# Patient Record
Sex: FEMALE | Race: WHITE | Employment: FULL TIME | URBAN - METROPOLITAN AREA
[De-identification: names, ages, dates, MRNs, and addresses within clinical notes are randomized per-mention and may not be internally consistent; named-entity substitution may affect disease eponyms.]

---

## 2018-09-07 ENCOUNTER — HOSPITAL ENCOUNTER (EMERGENCY)
Facility: CLINIC | Age: 28
Discharge: HOME | End: 2018-09-07
Attending: EMERGENCY MEDICINE | Admitting: EMERGENCY MEDICINE
Payer: COMMERCIAL

## 2018-09-07 ENCOUNTER — APPOINTMENT (OUTPATIENT)
Dept: CT IMAGING | Facility: CLINIC | Age: 28
End: 2018-09-07
Attending: EMERGENCY MEDICINE
Payer: COMMERCIAL

## 2018-09-07 VITALS
BODY MASS INDEX: 40.63 KG/M2 | OXYGEN SATURATION: 99 % | DIASTOLIC BLOOD PRESSURE: 62 MMHG | WEIGHT: 238 LBS | SYSTOLIC BLOOD PRESSURE: 112 MMHG | RESPIRATION RATE: 16 BRPM | HEART RATE: 78 BPM | TEMPERATURE: 98.7 F | HEIGHT: 64 IN

## 2018-09-07 DIAGNOSIS — R07.89 CHEST WALL PAIN: ICD-10-CM

## 2018-09-07 LAB
ALBUMIN UR-MCNC: NEGATIVE MG/DL
AMORPH CRY #/AREA URNS HPF: ABNORMAL /HPF
ANION GAP SERPL CALCULATED.3IONS-SCNC: 8 MMOL/L (ref 3–14)
APPEARANCE UR: ABNORMAL
BASOPHILS # BLD AUTO: 0 10E9/L (ref 0–0.2)
BASOPHILS NFR BLD AUTO: 0.2 %
BILIRUB UR QL STRIP: NEGATIVE
BUN SERPL-MCNC: 9 MG/DL (ref 7–30)
CALCIUM SERPL-MCNC: 8.1 MG/DL (ref 8.5–10.1)
CHLORIDE SERPL-SCNC: 108 MMOL/L (ref 94–109)
CO2 SERPL-SCNC: 23 MMOL/L (ref 20–32)
COLOR UR AUTO: YELLOW
CREAT SERPL-MCNC: 0.64 MG/DL (ref 0.52–1.04)
D DIMER PPP FEU-MCNC: 0.8 UG/ML FEU (ref 0–0.5)
DIFFERENTIAL METHOD BLD: ABNORMAL
EOSINOPHIL # BLD AUTO: 0.1 10E9/L (ref 0–0.7)
EOSINOPHIL NFR BLD AUTO: 0.8 %
ERYTHROCYTE [DISTWIDTH] IN BLOOD BY AUTOMATED COUNT: 13.9 % (ref 10–15)
GFR SERPL CREATININE-BSD FRML MDRD: >90 ML/MIN/1.7M2
GLUCOSE SERPL-MCNC: 90 MG/DL (ref 70–99)
GLUCOSE UR STRIP-MCNC: NEGATIVE MG/DL
HCT VFR BLD AUTO: 31.2 % (ref 35–47)
HGB BLD-MCNC: 10.4 G/DL (ref 11.7–15.7)
HGB UR QL STRIP: NEGATIVE
IMM GRANULOCYTES # BLD: 0.1 10E9/L (ref 0–0.4)
IMM GRANULOCYTES NFR BLD: 0.6 %
KETONES UR STRIP-MCNC: NEGATIVE MG/DL
LEUKOCYTE ESTERASE UR QL STRIP: ABNORMAL
LYMPHOCYTES # BLD AUTO: 1 10E9/L (ref 0.8–5.3)
LYMPHOCYTES NFR BLD AUTO: 12 %
MCH RBC QN AUTO: 29.9 PG (ref 26.5–33)
MCHC RBC AUTO-ENTMCNC: 33.3 G/DL (ref 31.5–36.5)
MCV RBC AUTO: 90 FL (ref 78–100)
MONOCYTES # BLD AUTO: 0.4 10E9/L (ref 0–1.3)
MONOCYTES NFR BLD AUTO: 5.3 %
MUCOUS THREADS #/AREA URNS LPF: PRESENT /LPF
NEUTROPHILS # BLD AUTO: 6.7 10E9/L (ref 1.6–8.3)
NEUTROPHILS NFR BLD AUTO: 81.1 %
NITRATE UR QL: NEGATIVE
NRBC # BLD AUTO: 0 10*3/UL
NRBC BLD AUTO-RTO: 0 /100
NT-PROBNP SERPL-MCNC: 33 PG/ML (ref 0–450)
PH UR STRIP: 7 PH (ref 5–7)
PLATELET # BLD AUTO: 224 10E9/L (ref 150–450)
POTASSIUM SERPL-SCNC: 3.7 MMOL/L (ref 3.4–5.3)
RBC # BLD AUTO: 3.48 10E12/L (ref 3.8–5.2)
RBC #/AREA URNS AUTO: 2 /HPF (ref 0–2)
SODIUM SERPL-SCNC: 139 MMOL/L (ref 133–144)
SOURCE: ABNORMAL
SP GR UR STRIP: 1.02 (ref 1–1.03)
SQUAMOUS #/AREA URNS AUTO: 7 /HPF (ref 0–1)
TROPONIN I SERPL-MCNC: <0.015 UG/L (ref 0–0.04)
UROBILINOGEN UR STRIP-MCNC: 0 MG/DL (ref 0–2)
WBC # BLD AUTO: 8.2 10E9/L (ref 4–11)
WBC #/AREA URNS AUTO: 0 /HPF (ref 0–5)

## 2018-09-07 PROCEDURE — 85025 COMPLETE CBC W/AUTO DIFF WBC: CPT | Performed by: EMERGENCY MEDICINE

## 2018-09-07 PROCEDURE — 93005 ELECTROCARDIOGRAM TRACING: CPT

## 2018-09-07 PROCEDURE — 36415 COLL VENOUS BLD VENIPUNCTURE: CPT | Performed by: EMERGENCY MEDICINE

## 2018-09-07 PROCEDURE — 85379 FIBRIN DEGRADATION QUANT: CPT | Performed by: EMERGENCY MEDICINE

## 2018-09-07 PROCEDURE — 71260 CT THORAX DX C+: CPT

## 2018-09-07 PROCEDURE — 25000128 H RX IP 250 OP 636: Performed by: EMERGENCY MEDICINE

## 2018-09-07 PROCEDURE — 99285 EMERGENCY DEPT VISIT HI MDM: CPT | Mod: 25

## 2018-09-07 PROCEDURE — 83880 ASSAY OF NATRIURETIC PEPTIDE: CPT | Performed by: EMERGENCY MEDICINE

## 2018-09-07 PROCEDURE — 84484 ASSAY OF TROPONIN QUANT: CPT | Performed by: EMERGENCY MEDICINE

## 2018-09-07 PROCEDURE — 81001 URINALYSIS AUTO W/SCOPE: CPT | Performed by: EMERGENCY MEDICINE

## 2018-09-07 PROCEDURE — 25000132 ZZH RX MED GY IP 250 OP 250 PS 637: Performed by: EMERGENCY MEDICINE

## 2018-09-07 PROCEDURE — 80048 BASIC METABOLIC PNL TOTAL CA: CPT | Performed by: EMERGENCY MEDICINE

## 2018-09-07 RX ORDER — IOPAMIDOL 755 MG/ML
500 INJECTION, SOLUTION INTRAVASCULAR ONCE
Status: COMPLETED | OUTPATIENT
Start: 2018-09-07 | End: 2018-09-07

## 2018-09-07 RX ORDER — ACETAMINOPHEN 325 MG/1
650 TABLET ORAL ONCE
Status: COMPLETED | OUTPATIENT
Start: 2018-09-07 | End: 2018-09-07

## 2018-09-07 RX ADMIN — IOPAMIDOL 79 ML: 755 INJECTION, SOLUTION INTRAVENOUS at 13:32

## 2018-09-07 RX ADMIN — ACETAMINOPHEN 650 MG: 325 TABLET, FILM COATED ORAL at 10:38

## 2018-09-07 RX ADMIN — SODIUM CHLORIDE 90 ML: 900 INJECTION, SOLUTION INTRAVENOUS at 13:32

## 2018-09-07 ASSESSMENT — ENCOUNTER SYMPTOMS
BACK PAIN: 1
FREQUENCY: 0
ARTHRALGIAS: 1

## 2018-09-07 NOTE — ED AVS SNAPSHOT
[facility] Emergency Department    [address]    [facility] [address]    Phone:  [phone]    Fax:  [phone]                                       Eloina Ann   MRN: [id number]    Department:  [facility] Emergency Department   Date of Visit:  9/7/2018           After Visit Summary Signature Page     I have received my discharge instructions, and my questions have been answered. I have discussed any challenges I see with this plan with the nurse or doctor.    ..........................................................................................................................................  Patient/Patient Representative Signature      ..........................................................................................................................................  Patient Representative Print Name and Relationship to Patient    ..................................................               ................................................  Date                                            Time    ..........................................................................................................................................  Reviewed by Signature/Title    ...................................................              ..............................................  Date                                                            Time          22EPIC Rev 08/18

## 2018-09-07 NOTE — ED AVS SNAPSHOT
[facility] Emergency Department    [address] [place] [address]    Phone:  [phone]    Fax:  [phone]                                       Eloina Ann   MRN: [id number]    Department:  [facility] Emergency Department   Date of Visit:  9/7/2018           Patient Information     Date Of Birth          1990        Your diagnoses for this visit were:     Chest wall pain        You were seen by Salina Martin MD.      Follow-up Information     Follow up with Clinic, [facility] [place]. Schedule an appointment as soon as possible for a visit in 3 days.    Contact information:    [address] [facility] [address]  [place] [address]  [phone]          Discharge Instructions       Please return to the ED if you have active chest pain, shortness of breath, nausea, sweatiness, or other acute changes.  Please follow up with your PCP in the next 2-3 days.      Discharge Instructions  Chest Pain    You have been seen today for chest pain or discomfort.  At this time, your provider has found no signs that your chest pain is due to a serious or life-threatening condition, (or you have declined more testing and/or admission to the hospital). However, sometimes there is a serious problem that does not show up right away. Your evaluation today may not be complete and you may need further testing and evaluation.     Generally, every Emergency Department visit should have a follow-up clinic visit with either a primary or a specialty clinic/provider. Please follow-up as instructed by your emergency provider today.  Return to the Emergency Department if:    Your chest pain changes, gets worse, starts to happen more often, or comes with less activity.    You are newly short of breath.    You get very weak or tired.    You pass out or faint.    You have any new symptoms, like fever, cough, numb legs, or you cough up blood.    You have anything else that worries  you.    Until you follow-up with your regular provider, please do the following:    Take one aspirin daily unless you have an allergy or are told not to by your provider.    If a stress test appointment has been made, go to the appointment.    If you have questions, contact your regular provider.    Follow-up with your regular provider/clinic as directed; this is very important.    If you were given a prescription for medicine here today, be sure to read all of the information (including the package insert) that comes with your prescription.  This will include important information about the medicine, its side effects, and any warnings that you need to know about.  The pharmacist who fills the prescription can provide more information and answer questions you may have about the medicine.  If you have questions or concerns that the pharmacist cannot address, please call or return to the Emergency Department.       Remember that you can always come back to the Emergency Department if you are not able to see your regular provider in the amount of time listed above, if you get any new symptoms, or if there is anything that worries you.      24 Hour Appointment Hotline       To make an appointment at any [facility]3], call [phone] ([phone]). If you don't have a family doctor or clinic, we will help you find one. [facility] are conveniently located to serve the needs of you and your family.             Review of your medicines      Our records show that you are taking the medicines listed below. If these are incorrect, please call your family doctor or clinic.        Dose / Directions Last dose taken    levothyroxine 150 MCG tablet   Commonly known as:  SYNTHROID/LEVOTHROID   Dose:  150 mcg   Quantity:  30 tablet        Take 1 tablet by mouth daily.   Refills:  6                Procedures and tests performed during your visit     Basic metabolic panel    CBC with platelets differential    CT Chest  Pulmonary Embolism w Contrast    D dimer quantitative    EKG 12-lead, tracing only    Nt probnp inpatient    Troponin I    UA reflex to Microscopic and Culture      Orders Needing Specimen Collection     None      Pending Results     Date and Time Order Name Status Description    9/7/2018 1132 CT Chest Pulmonary Embolism w Contrast Preliminary     9/7/2018 1028 EKG 12-lead, tracing only Preliminary             Pending Culture Results     No orders found from 9/5/2018 to 9/8/2018.            Pending Results Instructions     If you had any lab results that were not finalized at the time of your Discharge, you can call the ED Lab Result RN at [phone]. You will be contacted by this team for any positive Lab results or changes in treatment. The nurses are available 7 days a week from 10A to 6:30P.  You can leave a message 24 hours per day and they will return your call.        Test Results From Your Hospital Stay        9/7/2018 11:24 AM      Component Results     Component Value Ref Range & Units Status    Sodium 139 133 - 144 mmol/L Final    Potassium 3.7 3.4 - 5.3 mmol/L Final    Chloride 108 94 - 109 mmol/L Final    Carbon Dioxide 23 20 - 32 mmol/L Final    Anion Gap 8 3 - 14 mmol/L Final    Glucose 90 70 - 99 mg/dL Final    Urea Nitrogen 9 7 - 30 mg/dL Final    Creatinine 0.64 0.52 - 1.04 mg/dL Final    GFR Estimate >90 >60 mL/min/1.7m2 Final    Non  GFR Calc    GFR Estimate If Black >90 >60 mL/min/1.7m2 Final    African American GFR Calc    Calcium 8.1 (L) 8.5 - 10.1 mg/dL Final         9/7/2018 11:02 AM      Component Results     Component Value Ref Range & Units Status    WBC 8.2 4.0 - 11.0 10e9/L Final    RBC Count 3.48 (L) 3.8 - 5.2 10e12/L Final    Hemoglobin 10.4 (L) 11.7 - 15.7 g/dL Final    Hematocrit 31.2 (L) 35.0 - 47.0 % Final    MCV 90 78 - 100 fl Final    MCH 29.9 26.5 - 33.0 pg Final    MCHC 33.3 31.5 - 36.5 g/dL Final    RDW 13.9 10.0 - 15.0 % Final    Platelet Count 224 150 -  450 10e9/L Final    Diff Method Automated Method  Final    % Neutrophils 81.1 % Final    % Lymphocytes 12.0 % Final    % Monocytes 5.3 % Final    % Eosinophils 0.8 % Final    % Basophils 0.2 % Final    % Immature Granulocytes 0.6 % Final    Nucleated RBCs 0 0 /100 Final    Absolute Neutrophil 6.7 1.6 - 8.3 10e9/L Final    Absolute Lymphocytes 1.0 0.8 - 5.3 10e9/L Final    Absolute Monocytes 0.4 0.0 - 1.3 10e9/L Final    Absolute Eosinophils 0.1 0.0 - 0.7 10e9/L Final    Absolute Basophils 0.0 0.0 - 0.2 10e9/L Final    Abs Immature Granulocytes 0.1 0 - 0.4 10e9/L Final    Absolute Nucleated RBC 0.0  Final         9/7/2018 11:19 AM      Component Results     Component Value Ref Range & Units Status    D Dimer 0.8 (H) 0.0 - 0.50 ug/ml FEU Final    This D-dimer assay is intended for use in conjunction with a clinical pretest   probability assessment model to exclude pulmonary embolism (PE) and deep   venous thrombosis (DVT) in outpatients suspected of PE or DVT. The cut-off   value is 0.5 ug/mL FEU.           9/7/2018 11:24 AM      Component Results     Component Value Ref Range & Units Status    Troponin I ES <0.015 0.000 - 0.045 ug/L Final    The 99th percentile for upper reference range is 0.045 ug/L.  Troponin values   in the range of 0.045 - 0.120 ug/L may be associated with risks of adverse   clinical events.           9/7/2018  1:50 PM      Narrative     CT CHEST PULMONARY EMBOLISM WITH CONTRAST   9/7/2018 1:36 PM     HISTORY: Dyspnea.  16 weeks pregnant. Left shoulder pain that  tightness with taking a deep breath.    TECHNIQUE:  80 mL Isovue-370 IV. Radiation dose for this scan was  reduced using automated exposure control, adjustment of the mA and/or  kV according to patient size, or iterative reconstruction technique.    COMPARISON: Chest x-ray 12/19/2006    FINDINGS:  There is mild cardiomegaly with no specific chamber  enlargement. There is mild pulmonary vascular engorgement involving  pulmonary arteries  and veins. There is a sliding hiatal hernia.  Mediastinum and pulmonary saba otherwise appear normal. No pleural  effusion. No evidence of pulmonary embolism or aortic dissection.  Minimal left pleural effusion. No pulmonary infiltrates. Visualized  abdominal organs are unremarkable.        Impression     IMPRESSION: Mild cardiomegaly and pulmonary vascular engorgement. No  evidence of pulmonary embolism.         9/7/2018 12:34 PM      Component Results     Component Value Ref Range & Units Status    Color Urine Yellow  Final    Appearance Urine Cloudy  Final    Glucose Urine Negative NEG^Negative mg/dL Final    Bilirubin Urine Negative NEG^Negative Final    Ketones Urine Negative NEG^Negative mg/dL Final    Specific Gravity Urine 1.018 1.003 - 1.035 Final    Blood Urine Negative NEG^Negative Final    pH Urine 7.0 5.0 - 7.0 pH Final    Protein Albumin Urine Negative NEG^Negative mg/dL Final    Urobilinogen mg/dL 0.0 0.0 - 2.0 mg/dL Final    Nitrite Urine Negative NEG^Negative Final    Leukocyte Esterase Urine Small (A) NEG^Negative Final    Source Midstream Urine  Final    RBC Urine 2 0 - 2 /HPF Final    WBC Urine 0 0 - 5 /HPF Final    Squamous Epithelial /HPF Urine 7 (H) 0 - 1 /HPF Final    Mucous Urine Present (A) NEG^Negative /LPF Final    Amorphous Crystals Many (A) NEG^Negative /HPF Final         9/7/2018  2:41 PM      Component Results     Component Value Ref Range & Units Status    N-Terminal Pro BNP Inpatient 33 0 - 450 pg/mL Final       Reference range shown and results flagged as abnormal are suggested inpatient   cut points for confirming diagnosis if CHF in an acute setting. Establishing a   baseline value for each individual patient is useful for follow-up. An   inpatient or emergency department NT-proPBNP <300 pg/mL effectively rules out   acute CHF, with 99% negative predictive value.  The outpatient non-acute reference range for ruling out CHF is:   0-125 pg/mL (age 18 to less than 75)   0-450 pg/mL  (age 75 yrs and older)                  Clinical Quality Measure: Blood Pressure Screening     Your blood pressure was checked while you were in the emergency department today. The last reading we obtained was  BP: 112/62 . Please read the guidelines below about what these numbers mean and what you should do about them.  If your systolic blood pressure (the top number) is less than 120 and your diastolic blood pressure (the bottom number) is less than 80, then your blood pressure is normal. There is nothing more that you need to do about it.  If your systolic blood pressure (the top number) is 120-139 or your diastolic blood pressure (the bottom number) is 80-89, your blood pressure may be higher than it should be. You should have your blood pressure rechecked within a year by a primary care provider.  If your systolic blood pressure (the top number) is 140 or greater or your diastolic blood pressure (the bottom number) is 90 or greater, you may have high blood pressure. High blood pressure is treatable, but if left untreated over time it can put you at risk for heart attack, stroke, or kidney failure. You should have your blood pressure rechecked by a primary care provider within the next 4 weeks.  If your provider in the emergency department today gave you specific instructions to follow-up with your doctor or provider even sooner than that, you should follow that instruction and not wait for up to 4 weeks for your follow-up visit.        Thank you for choosing [facility]       Thank you for choosing [facility] for your care. Our goal is always to provide you with excellent care. Hearing back from our patients is one way we can continue to improve our services. Please take a few minutes to complete the written survey that you may receive in the mail after you visit with us. Thank you!        [facility] Information     NextPage lets you send messages to your doctor, view your test results, renew your prescriptions,  "schedule appointments and more. To sign up, go to [email or web address] . Click on \"Log in\" on the left side of the screen, which will take you to the Welcome page. Then click on \"Sign up Now\" on the right side of the page.     You will be asked to enter the access code listed below, as well as some personal information. Please follow the directions to create your username and password.     Your access code is: [email or web address]  Expires: 2018  3:18 PM     Your access code will  in 90 days. If you need help or a new code, please call your [facility] clinic or [phone].        Care EveryWhere ID     This is your Care EveryWhere ID. This could be used by other organizations to access your [facility] medical records  [id number]        Equal Access to Services     JAIDEN BLAKE : Chasity Ying, xiomara calvin, nicole workman, marielena cortes . So [facility] [phone].    ATENCIÓN: Si habla español, tiene a hernandez disposición servicios gratuitos de asistencia lingüística. Llame al [phone].    We comply with applicable federal civil rights laws and [place] laws. We do not discriminate on the basis of race, color, national origin, age, disability, sex, sexual orientation, or gender identity.            After Visit Summary       This is your record. Keep this with you and show to your community pharmacist(s) and doctor(s) at your next visit.                  "

## 2018-09-07 NOTE — ED TRIAGE NOTES
Pt has left shoulder pain since last night.  Today, the pain is near rib area.  Pain is worse with deep breaths.  Pt is 15 weeks pregnant, no vaginal bleeding.

## 2018-09-07 NOTE — ED PROVIDER NOTES
"  History     Chief Complaint:  Shoulder Pain      HPI   Eloina Ann is a 28 year old female, approximately 16 weeks pregnant,  who presents with her mother for evaluation of some shoulder pain. The patient reports that she started to feel a pain in her left shoulder which feels like a knot and tightens with taking a deep breath. The patient reports that the pain is present constantly but is worsened by the deep breath. She describes the pain as a stabbing pain. She reports that her symptoms were worst like night but the pain is still present today. Today, she also reports some radiation into her back. The patient does not have any additional concerns at this time. She mentions some vaginal discharge which is not new or concerning for her. The patient denies urinary symptoms of increased urgency or increased frequency. She denies any falls, or trauma.The patient denies any known family history of blood clots. She denies any complications with the pregnancy. She took tylenol last night for symptoms.     Allergies:  Sulfa drugs     Medications:    Levothyroxine     Past Medical History:    Thyroid disease    Past Surgical History:    History reviewed. No pertinent surgical history.    Family History:    No family history on file.    Social History:  The patient  reports that she has never smoked. She does not have any smokeless tobacco history on file. She reports that she does not drink alcohol or use illicit drugs.   Marital Status:  Single [1]     Review of Systems   Genitourinary: Positive for vaginal discharge. Negative for frequency and urgency.   Musculoskeletal: Positive for arthralgias and back pain.   All other systems reviewed and are negative.    Physical Exam   Vital signs:  Temp: 98.7  F (37.1  C) Temp src: Temporal BP: 112/62 Pulse: 78 Heart Rate: 83 Resp: 18 SpO2: 99 % O2 Device: None (Room air)   Height: 162.6 cm (5' 4\") Weight: 108 kg (238 lb)  Estimated body mass index is 40.85 kg/(m^2) as " "calculated from the following:    Height as of this encounter: 1.626 m (5' 4\").    Weight as of this encounter: 108 kg (238 lb).      Patient Vitals for the past 24 hrs:   BP Temp Temp src Pulse Heart Rate Resp SpO2 Height Weight   09/07/18 1500 112/62 - - - - - - - -   09/07/18 1445 114/73 - - - - - 99 % - -   09/07/18 1430 116/73 - - - - - 100 % - -   09/07/18 1416 113/71 - - - 83 - 100 % - -   09/07/18 1415 113/72 - - - - - 100 % - -   09/07/18 1411 113/71 - - 78 - 18 100 % - -   09/07/18 1410 - - - - - - 100 % - -   09/07/18 1130 119/72 - - - - - - - -   09/07/18 1115 113/74 - - - - - - - -   09/07/18 1100 123/79 - - - - - - - -   09/07/18 1045 122/81 - - - - - - - -   09/07/18 1006 127/87 98.7  F (37.1  C) Temporal - 97 18 98 % 1.626 m (5' 4\") 108 kg (238 lb)         Physical Exam  General: Resting on the bed.  Head: No obvious trauma to head.  Ears, Nose, Throat:  External ears normal.  Nose normal.   Eyes:  Conjunctivae clear.  Pupils are equal, round, and reactive.   Neck: Normal range of motion.  Neck supple.   CV: Regular rate and rhythm.  No murmurs.  2 + radial pulses.  Tender left costal margin.    Respiratory: Effort normal and breath sounds normal.  No wheezing or crackles.   Gastrointestinal: Soft.  No distension. There is no tenderness.   Musculoskeletal: Non tender non edematous calves  Neuro: Alert. Moving all extremities appropriately.  Normal speech.    Skin: Skin is warm and dry.  No rash noted.     Emergency Department Course   ECG (10:43:25):  Rate 79 bpm. WV interval 132. QRS duration 90. QT/QTc 390/447. P-R-T axes 6 44 19. Normal Sinus rhythm. Normal EKG.  Interpreted at 1050 by Salina Martin MD.     Imaging:  CT Chest Pulmonary Embolism w Contrast   Preliminary Result   IMPRESSION: Mild cardiomegaly and pulmonary vascular engorgement. No   evidence of pulmonary embolism.           Laboratory:  UA: Leukocyte Esterase Urine Small, Squamous Epithelial 7H, Mucous Urine Present, " Amorphous Crystals Many otherwise within normal limits   BMP: 8.1  CMP: RBC 3.48, HGB 10.4 , 'HCT 31.2, otherwise within normal limits   D Dimer 0.8  Troponin <0.015 @ 1124  Nt probnp: 33      Interventions:  1033: NS 1L IV Bolus   1038: Tylenol 650mg PO     Emergency Department Course:  1005 Nursing notes and vitals reviewed.  I performed an exam of the patient as documented above.     IV inserted. Medicine administered as documented above. Blood drawn. This was sent to the lab for further testing, results above.    The patient was sent for a CT Chest while in the emergency department, findings above.     EKG obtained, findings above.     1506: I spoke with Cardiologist, Dr. Menezes     Findings and plan explained to the Patient. Patient discharged home with instructions regarding supportive care, medications, and reasons to return. The importance of close follow-up was reviewed    I personally reviewed the laboratory results with the Patient and answered all related questions prior to discharge.   Impression & Plan    Medical Decision Makin-year-old female 16 weeks pregnant who presents with chest pain.  Vital signs unremarkable.  Broad differential pursued including not limited to cardiomyopathy, PE, acute coronary syndrome, electrolyte metabolic or renal dysfunction, chest wall pain, pericarditis, myocarditis, dehydration, muscular pain, etc.  Overall patient's well-appearing nontoxic.  He was obtained given that she is pregnant only show small leukoesterase is no evidence of bacteria or other signs of infection.  No suggestion of asymptomatic bacteriuria.  BMP shows no acute electrolyte metabolic or renal dysfunction.  CBC shows no leukocytosis and mild anemia 10.4, within normal.  EKG shows sinus rhythm no acute ST-T wave changes, normal troponin troponin, normal intervals, no evidence of ischemia.  Patient does not have any chest pain.  No suggestion of myocarditis or pericarditis.  Was mostly concerned  about PE and d-dimer is positive at 0.8, this is about the first trimester cut up as well.  Therefore discussed with radiology and patient opted to do CT PE study.  CT PE shows no evidence of pulmonary embolism but does show mild cardiomegaly and pulmonary vascular engorgement.  BMP adenopathy normal.  Discussed in brief with cardiology they felt that the  mild cardiomegaly may be secondary to patient's pregnancy and physiologic.  With otherwise reassuring labs and vital signs and not per the echocardiogram or further workup is warranted.  Consider preeclampsia the patient is under 20 weeks and a normal vital signs this was unlikely.  Patient was advised to do warm compresses and Tylenol.  Suspect is most likely musculoskeletal chest wall pain.  She was on exam plan was discharged in stable condition.    Critical Care time:  none    Diagnosis:    ICD-10-CM    1. Chest wall pain R07.89        Disposition:  discharged to home    Discharge Medications:  New Prescriptions    No medications on file     Dann VALLES am serving as a scribe at 10:05 AM on 9/7/2018 to document services personally performed by Salina Martin MD based on my observations and the provider's statements to me.    [facility] EMERGENCY DEPARTMENT       Salina Martin MD  09/07/18 3103

## 2018-09-10 LAB — INTERPRETATION ECG - MUSE: NORMAL

## 2021-09-21 PROBLEM — Z78.9 NOT IMMUNE TO HEPATITIS B VIRUS: Status: ACTIVE | Noted: 2018-07-13

## 2021-09-23 ENCOUNTER — OFFICE VISIT (OUTPATIENT)
Dept: SURGERY | Facility: CLINIC | Age: 31
End: 2021-09-23
Payer: COMMERCIAL

## 2021-09-23 VITALS
DIASTOLIC BLOOD PRESSURE: 84 MMHG | BODY MASS INDEX: 46.78 KG/M2 | SYSTOLIC BLOOD PRESSURE: 120 MMHG | WEIGHT: 274 LBS | HEIGHT: 64 IN

## 2021-09-23 DIAGNOSIS — L91.8 MULTIPLE ACQUIRED SKIN TAGS: ICD-10-CM

## 2021-09-23 DIAGNOSIS — M25.579 CHRONIC ANKLE PAIN, UNSPECIFIED LATERALITY: ICD-10-CM

## 2021-09-23 DIAGNOSIS — L68.0 HIRSUTISM: ICD-10-CM

## 2021-09-23 DIAGNOSIS — G89.29 CHRONIC ANKLE PAIN, UNSPECIFIED LATERALITY: ICD-10-CM

## 2021-09-23 DIAGNOSIS — G47.8 NON-RESTORATIVE SLEEP: ICD-10-CM

## 2021-09-23 DIAGNOSIS — E07.9 THYROID DISEASE: ICD-10-CM

## 2021-09-23 DIAGNOSIS — E66.01 MORBID OBESITY (H): Primary | ICD-10-CM

## 2021-09-23 DIAGNOSIS — M54.50 LOW BACK PAIN, UNSPECIFIED BACK PAIN LATERALITY, UNSPECIFIED CHRONICITY, UNSPECIFIED WHETHER SCIATICA PRESENT: ICD-10-CM

## 2021-09-23 PROCEDURE — 99205 OFFICE O/P NEW HI 60 MIN: CPT | Performed by: FAMILY MEDICINE

## 2021-09-23 RX ORDER — PHENTERMINE HYDROCHLORIDE 37.5 MG/1
18.75-37.5 TABLET ORAL
Qty: 90 TABLET | Refills: 0 | Status: SHIPPED | OUTPATIENT
Start: 2021-09-23 | End: 2021-12-22

## 2021-09-23 RX ORDER — LEVOTHYROXINE SODIUM 175 UG/1
1 TABLET ORAL DAILY
COMMUNITY
Start: 2021-07-08

## 2021-09-23 RX ORDER — NAPROXEN 500 MG/1
1 TABLET ORAL DAILY
COMMUNITY
Start: 2021-04-10 | End: 2023-05-16

## 2021-09-23 ASSESSMENT — MIFFLIN-ST. JEOR: SCORE: 1955.61

## 2021-09-23 NOTE — LETTER
9/23/2021         RE: Eloina Ann  [id number] [facility] [address]        Dear Colleague,    Thank you for referring your patient, Eloina Ann, to the [facility] [place]. Please see a copy of my visit note below.    BARIATRIC CONSULTATION    Impression: Eloina Ann is a 31 year old year old female with  has a past medical history of Ankle pain, Esophageal reflux, Hirsutism, Low back pain, Multiple acquired skin tags, and Thyroid disease.  Poor functional capacity and musculoskeletal disability in the setting of the abovementioned weight related co-morbidities. Her Body mass index is 46.78 kg/m ..    Plan: DIET: 24 week program. Meal planning, protein first for insulin resistance.   EXERCISE start counting steps with apple phone   REFERRAL(S) sleep study, no labs d/t high deductible. 24 week program.    PHARMACOTHERAPY  Phentermine 1/2 tab in the am..     We discussed [facility] Basics including:  -eating 3 meals daily  -eating protein first  -eating slowly, chewing food well  -avoiding/limiting calorie containing beverages  -choosing wheat, not white with breads, crackers, pastas, deni, bagels, tortillas, rice  -limiting restaurant or cafeteria eating to twice a week or less    We discussed the importance of restorative sleep and stress management in maintaining a healthy weight.    We reviewed medications associated with weight gain.    We discussed insulin resistance and glycemic index as it relates to appetite and weight control.     We discussed the National Weight Control Registry healthy weight maintenance strategies and ways to optimize metabolism.  We discussed the importance of physical activity including cardiovascular and strength training in maintaining a healthier weight and explored viable options.    We discussed medications available for weight loss including Phentermine, Phendimetrazine, Topamax, Qsymia, ,  "Diethylproprion, Orlistat, Contrave, Saxenda, Wegovy and Vyvanse. We discussed the risks and benefits of each. We discussed indications, contraindications, potential side effects, and estimated costs of each. Literature was provided. Eloina understands that not using a weight loss medication is an option.   60 minutes spent with patient. >50% in counseling.      History Surrounding Consultation  Struggles with weight started at age as a youngster  Her weight at age 20 was 170#  She has had several past supervised and unsupervised weight loss attempts  The most weight lost was: 20#  Unfortunately there was not durable weight maintenance.  History of bulimia, anorexia, or binge eating disorder? no  If Present has eating disorder been in remission at least 3 years? no  Night time eating? no    Dietary History  Meals per day: 2-3  Snacks: between meals  Typical Snack: chips  Who does the grocery shopping? shared  Who does the cooking? She does  A typical meal includes: B:eggs with cheese and ham or turkey, coffee L: sandwich white-12 grain or leftovers D:chilpoltle  Regular Pop: none  Juice: no  Caffeine: coffee 2/c  Amount of restaurant eating per week: 3  Eating a the table with the TV off? yes    Physical Activity Patterns  Current physical activity routine includes: Used to play hockey and had an exercise gym in her apartment.     Limitations from being physically active on a regular basis includes: knees    She describes her general health as: fair    Past Medical History  HTN: no  Dyslipidemia: ?  KATJA: no but snores loudly and has neck 16\" and malapati 4+  Obesity Hypoventilation: NO  DM2: no DM1: no DX: no Most recent AIC: NA  Neuropathy: no  Nephropathy: no  Retinopathy: no Glaucoma no  IFG or \"pre-DM\": no  MI: no  CVA:no  CHF: no  Heart Valves: native  Previous cardiac testing includes:   Cancers: no  Kidney Disease: no  DVT: no  PE: no  Colitis: no  Crohn's: no  IBS: no  PUD: no  Fatty Liver: ?  Abnormal " LFTs: no  Hepatitis: no  Asthma: no  Bronchitis: no  Pneumonia: no  Other Lung Problems: no  Back Pain:yes  DDD: no  Gout: no  Fibromyalgia: no  USI: yes  Severe Headaches: no  Seizures: no If so, last seizure: no  Pseudotumor: no  PCOS: yes  Menstrual Irregularity: no  Menorrhagia: no  Infertility: no  Thyroid problems: yes  Thyroid medications: yes  HIV positive: NO  MRSA/VRE history: no  History of Blood transfusion: no  Anemia: no    Health Care Maintenance  Colonoscopy: NA  Mammogram: NA  Pap: utd    Medications   Current Outpatient Medications   Medication Sig Dispense Refill     etonogestrel (NEXPLANON) 68 MG IMPL Inject 68 mg Subcutaneous       levothyroxine (SYNTHROID, LEVOTHROID) 150 MCG tablet Take 1 tablet by mouth daily. 30 tablet 6     levothyroxine (SYNTHROID/LEVOTHROID) 175 MCG tablet Take 1 tablet by mouth daily       naproxen (NAPROSYN) 500 MG tablet Take 1 tablet by mouth daily       phentermine (ADIPEX-P) 37.5 MG tablet Take 0.5-1 tablets (18.75-37.5 mg) by mouth every morning (before breakfast) 90 tablet 0     Allergies   Sulfa drugs and Vancomycin  Past Surgical History  Past Surgical History:   Procedure Laterality Date      SECTION       NM THYROID ABLATION THERAPY I 131       WISDOM TOOTH EXTRACTION       History of problems with anesthesia: no  History of Malignant Hyperthermia: NO    Gynecological History  Regular: yes  Currently: none with implant  Problems getting pregnant: no  MD Involvement: NA If so, explanation/Diagnosis: NA  : 1  Para: 1001  C-S: 1  Vaginal deliveries: 0  SAB:no  EAB: no  Gestational DM: no  Gestational HTN: no  Preeclampsia: no  Current Birth Control: nexplanon    Family History  family history includes Breast Cancer in her paternal grandmother; Celiac Disease in her father; No Known Problems in her mother.    Social History  Status: boyfriend  Children: 1 son  Work Status: FT accounting      Addiction History  Smoking History:   Started smoking:  "never Quit smoking: NA Total years of tobacco use: 0  Alcohol use: 0-5/wk  Current or Past history of alcohol or substance abuse: no  Last used: no  Chemical Dependency Treatment History: no  Chemicals: none    Psychiatric History  Diagnoses: anxiety  Treated by: NA  Psychiatric Hospitalizations: no  Suicide attempts: no  ECT: no  Panic attacks: no  History of Abuse: no    Palliative Medicine History  Involvement in a pain clinic: no    ROS  Sleep  Snoring: yes  PND: yes  Witnessed Apneas: no  [place]: 10  STOP BAN/8  General  Fatigue: yes  Sleep Quality:non-restorative 6 hours,   HEENT  Visual changes: no  Gastrointestinal  Heartburn: yes  Dysphagia: no  Cardiovascular  Murmur: no  Elevated BP: no  Chest Pain with Exertion: no  Dyspnea with Exertion: yes  Palpitations: no  Lower Extremity Edema: yes  Syncope: no  Pulmonary  Shortness of breath at rest: no  Snoring: yes  PND: yes  Wheezing: no  CPAP use: no  Gastrointestinal  Trouble swallowing:no  Heartburn: yes  HX UGI/EGD: no  Abdominal pain: no  Hematochezia: no  Urologic  Hesitancy: no  Urgency: yes  Genitourinary  ED: NA  Menorrhagia: no  Dysmenorrhea: no  Neurologic  Severe headache:no  Paresthesias: no  Psychiatric  Moods Stable: yes  Hallucinations: no  Rheumatologic  Myalgias: yes  Arthralgias: yes  Endocrine  Polydipsia: no  Polyuria: no  Galactorrhea: no  Heat intolerance: yes  Hirsutism: yes  Musculoskeletal  Joint pain;yes  Falls: no  Use of cane, crutch or motorized scooter: no  Hematologic  Abnormal Bleeding or Clotting: no  Dermatologic  Skin Tags: yes  Striae: yes  Furuncless: no  Acne: no  Intertrigo: o  Lower Leg ulcers: no      Physical Exam  /84 (BP Location: Right arm, Patient Position: Sitting, Cuff Size: Adult Large)   Ht 1.63 m (5' 4.17\")   Wt 124.3 kg (274 lb)   Breastfeeding No   BMI 46.78 kg/m          General Appearance  No acute distress. Obesity: central  Alert: yes  Sleepy: no  HEENT  PERRLA, EOMI  Neck  Stout: 15\" No " carotid bruits  Airway: 4+  Cardiovascular  Rhythm regular Rate Regular  Murmur: no  Pulmonary  [place] Score: 10  Lungs clear to ascultation  Abdomen  No rashes.   Post surgical Scars: c-s  Extremities:  Pitting edema: trace  Palpable distal pulses: 2+  Varicose veins: no  Neurologic  Tremors: no  Psychiatric  Thought Content Organized  Mood appears stable  Endocrine  Moon Facies: NO  Dorsal Thoracic Prominence: NO  Skin tags: yes  Acanthosis nigricans: no  Dermatologic  Intertrigo: no            Total time spent on the date of this encounter doing: chart review, review of test results, patient visit, physical exam, education, counseling, developing plan of care, and documenting = 60 minutes.              Again, thank you for allowing me to participate in the care of your patient.        Sincerely,        Theresa Shell MD

## 2021-09-23 NOTE — PATIENT INSTRUCTIONS
"[facility] Basics    Remember to: download the GOOD RX  [place], Vitamin D3, 2000U daily, B-12 \"sublingual\"or \"Rapid Disssolve\"    -Eat 3 meals a day (not 2, not 5) Chew your food well/SLOW down  -Eat your protein first  -Be a water drinker/Minize liquid calories (no regular pop, no juice) skim or 1% milk OK  -Sleep 7-8 hours each night. Address sleep if problematic  -Stress management is important. Address if problematic  -Move-8000 steps daily Muscle: maintain your muscle mass (strength training 2X/wk)  -Wheat, not white (bread, pasta, crackers, deni, bagels, tortillas, rice)  -Limit restaurant, cafeteria, take out, drive through to 2 times per week or less  -Minimize caffeine, alcohol, and night-time snacking  -Consider keeping a food diary (i.e. My Fitness Pal, Lose It, or other food tracker)  -Follow up with the dietitian    MEDICATIONS FOR WEIGHT LOSS    PHENTERMINE (Adipex): approved in 1959 for appetite suppression.  It has stimulant effects and cannot be used with Ritalin, Concerta, or other stimulants.  It is not addictive although it's chemically related to amphetamines.  Amphetamines are addictive. The most common side effects are dry mouth, increased energy and concentration, increased pulse, and constipation.  You should not take phentermine if you have glaucoma, hyperthyroidism, or uncontrolled/untreated hypertension.  $24-$30 for 90 tablets    PHENDIMETRAZINE (Bontril): Appetite suppressant/sympathomimetic.  Controlled substance.  Side effects and contraindications similar to phentermine.  $45-$60 for 3 month supply    TOPIRAMATE (Topamax): Anti-seizure medication, also used to prevent migraines.  Side effects include paresthesia, glaucoma, altered concentration, attention difficulties, memory and speech problems, metabolic acidosis, depression, increase in body temperature and decrease sweating, kidney stones, and weight loss.  Do not take Topamax while taking Depakote as this can cause high " ammonia levels.  You must have reliable birth control as Topamax can cause birth defects.  Discontinue slowly to avoid seizure.  Insurance usually covers Topiramate.    QSYMIA (Phentermine + Topamax):  See above information about phentermine and Topamax.  Most common side effects are paresthesia, dizziness, distortion of taste, insomnia, constipation, and dry mouth.  $150-$220 per month    DIETHYLPROPION (Tenuate): Sympathomimetic amine.  Appetite suppressant.  Doses 25 mg before meals or 75 mg per day.  Most common side effects are hypertension, palpitations, EKG changes, and increased seizures in epileptics.  There can be a possible adverse reaction with alcohol.  $70-$90 per 3 months    XENICAL(Orlistat) (Robert OTC): Approved in 1999.  A fat-blocker.  It reduces absorption of fat by approximately 30%.  It has beneficial effects on lipid levels.  Side effects include diarrhea, abdominal cramping, fecal incontinence, oily spotting, and flatus with discharge.  Side effects are minimized if the patient limits their dietary fat to no more than 30% of their diet.  Patients must take a multivitamin daily to avoid vitamin D, E, A, and K deficiency.  $120 per month    CONTRAVE (naltrexone/bupropion): Approved in 2014.  It is a combination pill including an opioid receptor blocker and a long-standing antidepressant.  Most common side effects include nausea, constipation, headache, vomiting, dizziness, trouble sleeping, dry mouth, and diarrhea.  With all antidepressants watch for mood changes and suicide ideation.  Bupropion has been known to lower the seizure threshold in those prone to seizures.  It should not be used in a patient with a recent history of bulimia. It has been associated with liver damage from taking higher than recommended doses.  Do not use countrave if you have taken opioid medications or opioid street drugs in the past 7-10 days, if you are currently on opioids, methadone, or if you are pregnant.  Do  not use contrave if you have recently stopped using alcohol or benzodiazepines.  Taper off contrave slowly.  Dosing: titrate up to 2 tablets twice daily of the Naltrexone 8 mg/ Bupropion 90 mg tablets.  $200 for 90 tablets    SAXENDA (Liraglutide): A daily injectable (3mg daily) medication used for type 2 diabetes. Glucagon-like peptide-1 (GLP-1) agonist. Contraindications include personal or family history of medullary thyroid cancer or MEN type 2. Acute pancreatitis has been observed in patients taking liraglutide. Liraglutide causes C-cell tumors in rats and mice. It is unknown whether liraglutide causes tumors in humans. Start at 0.6mg, increasing the dose weekly up to 3mg.     VYVANSE (Lisdexamfetamine dimesylate): a CNS stimulant used to treat ADHD. Indicated for the treatment of moderate to severe Binge Eating Disorder in Adults. Contraindicated in patients with known heart disease, structural abnormalities of the heart, serious heart arrhythmias or unexplained syncope. CNS stimulants such as vyvanse may cause manic or psychotic symptoms in patients with BPAD or pre-existing psychosis. Use with caution in patients with Raynaud's phenomenon. Most common side effects include dry mouth, insomnia, decreased appetite, increased heart rate, jittery feeling, constipation and anxiety.     WEGOVY (Semaglutide): A weekly injectable (2.4mg weekly) medication used for type 2 diabetes. Glucagon-like peptide-1 (GLP-1) agonist. Contraindications include personal or family history of medullary thyroid cancer or MEN type 2. Acute pancreatitis has been observed in patients taking Semaglutide. Semaglutide causes C-cell tumors in rats and mice. It is unknown whether Semaglutide causes tumors in humans. Start at 0.25mg, increasing to 0.5, 1.0, 1.7 then 2.4 monthly.   **Some lean proteins: chicken, turkey, tuna, salmon, crab, fish, shrimp, scallops, lobster, lean cuts of beef and pork, luncheon meats, veggie burgers, beans (black,  lima, garbanzo, durand, kidney, refried), chile, cottage cheese, string cheese, other cheese, eggs, tofu, peanut butter, nuts, vegan crumbles, greek yogurt

## 2021-09-23 NOTE — PROGRESS NOTES
BARIATRIC CONSULTATION    Impression: Eloina Ann is a 31 year old year old female with  has a past medical history of Ankle pain, Esophageal reflux, Hirsutism, Low back pain, Multiple acquired skin tags, and Thyroid disease.  Poor functional capacity and musculoskeletal disability in the setting of the abovementioned weight related co-morbidities. Her Body mass index is 46.78 kg/m ..    Plan: DIET: 24 week program. Meal planning, protein first for insulin resistance.   EXERCISE start counting steps with apple phone   REFERRAL(S) sleep study, no labs d/t high deductible. 24 week program.    PHARMACOTHERAPY  Phentermine 1/2 tab in the am..     We discussed [facility] Basics including:  -eating 3 meals daily  -eating protein first  -eating slowly, chewing food well  -avoiding/limiting calorie containing beverages  -choosing wheat, not white with breads, crackers, pastas, deni, bagels, tortillas, rice  -limiting restaurant or cafeteria eating to twice a week or less    We discussed the importance of restorative sleep and stress management in maintaining a healthy weight.    We reviewed medications associated with weight gain.    We discussed insulin resistance and glycemic index as it relates to appetite and weight control.     We discussed the National Weight Control Registry healthy weight maintenance strategies and ways to optimize metabolism.  We discussed the importance of physical activity including cardiovascular and strength training in maintaining a healthier weight and explored viable options.    We discussed medications available for weight loss including Phentermine, Phendimetrazine, Topamax, Qsymia, , Diethylproprion, Orlistat, Contrave, Saxenda, Wegovy and Vyvanse. We discussed the risks and benefits of each. We discussed indications, contraindications, potential side effects, and estimated costs of each. Literature was provided. Eloina understands that not using a weight loss medication  "is an option.   60 minutes spent with patient. >50% in counseling.      History Surrounding Consultation  Struggles with weight started at age as a youngster  Her weight at age 20 was 170#  She has had several past supervised and unsupervised weight loss attempts  The most weight lost was: 20#  Unfortunately there was not durable weight maintenance.  History of bulimia, anorexia, or binge eating disorder? no  If Present has eating disorder been in remission at least 3 years? no  Night time eating? no    Dietary History  Meals per day: 2-3  Snacks: between meals  Typical Snack: chips  Who does the grocery shopping? shared  Who does the cooking? She does  A typical meal includes: B:eggs with cheese and ham or turkey, coffee L: sandwich white-12 grain or leftovers D:chilpoltle  Regular Pop: none  Juice: no  Caffeine: coffee 2/c  Amount of restaurant eating per week: 3  Eating a the table with the TV off? yes    Physical Activity Patterns  Current physical activity routine includes: Used to play hockey and had an exercise gym in her apartment.     Limitations from being physically active on a regular basis includes: knees    She describes her general health as: fair    Past Medical History  HTN: no  Dyslipidemia: ?  KATJA: no but snores loudly and has neck 16\" and malapati 4+  Obesity Hypoventilation: NO  DM2: no DM1: no DX: no Most recent AIC: NA  Neuropathy: no  Nephropathy: no  Retinopathy: no Glaucoma no  IFG or \"pre-DM\": no  MI: no  CVA:no  CHF: no  Heart Valves: native  Previous cardiac testing includes:   Cancers: no  Kidney Disease: no  DVT: no  PE: no  Colitis: no  Crohn's: no  IBS: no  PUD: no  Fatty Liver: ?  Abnormal LFTs: no  Hepatitis: no  Asthma: no  Bronchitis: no  Pneumonia: no  Other Lung Problems: no  Back Pain:yes  DDD: no  Gout: no  Fibromyalgia: no  USI: yes  Severe Headaches: no  Seizures: no If so, last seizure: no  Pseudotumor: no  PCOS: yes  Menstrual Irregularity: no  Menorrhagia: " no  Infertility: no  Thyroid problems: yes  Thyroid medications: yes  HIV positive: NO  MRSA/VRE history: no  History of Blood transfusion: no  Anemia: no    Health Care Maintenance  Colonoscopy: NA  Mammogram: NA  Pap: utd    Medications   Current Outpatient Medications   Medication Sig Dispense Refill     etonogestrel (NEXPLANON) 68 MG IMPL Inject 68 mg Subcutaneous       levothyroxine (SYNTHROID, LEVOTHROID) 150 MCG tablet Take 1 tablet by mouth daily. 30 tablet 6     levothyroxine (SYNTHROID/LEVOTHROID) 175 MCG tablet Take 1 tablet by mouth daily       naproxen (NAPROSYN) 500 MG tablet Take 1 tablet by mouth daily       phentermine (ADIPEX-P) 37.5 MG tablet Take 0.5-1 tablets (18.75-37.5 mg) by mouth every morning (before breakfast) 90 tablet 0     Allergies   Sulfa drugs and Vancomycin  Past Surgical History  Past Surgical History:   Procedure Laterality Date      SECTION       NM THYROID ABLATION THERAPY I 131       WISDOM TOOTH EXTRACTION       History of problems with anesthesia: no  History of Malignant Hyperthermia: NO    Gynecological History  Regular: yes  Currently: none with implant  Problems getting pregnant: no  MD Involvement: NA If so, explanation/Diagnosis: NA  : 1  Para: 1001  C-S: 1  Vaginal deliveries: 0  SAB:no  EAB: no  Gestational DM: no  Gestational HTN: no  Preeclampsia: no  Current Birth Control: nexplanon    Family History  family history includes Breast Cancer in her paternal grandmother; Celiac Disease in her father; No Known Problems in her mother.    Social History  Status: boyfriend  Children: 1 son  Work Status: FT accounting      Addiction History  Smoking History:   Started smoking: never Quit smoking: NA Total years of tobacco use: 0  Alcohol use: 0-5/wk  Current or Past history of alcohol or substance abuse: no  Last used: no  Chemical Dependency Treatment History: no  Chemicals: none    Psychiatric History  Diagnoses: anxiety  Treated by: NA  Psychiatric  "Hospitalizations: no  Suicide attempts: no  ECT: no  Panic attacks: no  History of Abuse: no    Palliative Medicine History  Involvement in a pain clinic: no    ROS  Sleep  Snoring: yes  PND: yes  Witnessed Apneas: no  [place]: 10  STOP BAN/8  General  Fatigue: yes  Sleep Quality:non-restorative 6 hours,   HEENT  Visual changes: no  Gastrointestinal  Heartburn: yes  Dysphagia: no  Cardiovascular  Murmur: no  Elevated BP: no  Chest Pain with Exertion: no  Dyspnea with Exertion: yes  Palpitations: no  Lower Extremity Edema: yes  Syncope: no  Pulmonary  Shortness of breath at rest: no  Snoring: yes  PND: yes  Wheezing: no  CPAP use: no  Gastrointestinal  Trouble swallowing:no  Heartburn: yes  HX UGI/EGD: no  Abdominal pain: no  Hematochezia: no  Urologic  Hesitancy: no  Urgency: yes  Genitourinary  ED: NA  Menorrhagia: no  Dysmenorrhea: no  Neurologic  Severe headache:no  Paresthesias: no  Psychiatric  Moods Stable: yes  Hallucinations: no  Rheumatologic  Myalgias: yes  Arthralgias: yes  Endocrine  Polydipsia: no  Polyuria: no  Galactorrhea: no  Heat intolerance: yes  Hirsutism: yes  Musculoskeletal  Joint pain;yes  Falls: no  Use of cane, crutch or motorized scooter: no  Hematologic  Abnormal Bleeding or Clotting: no  Dermatologic  Skin Tags: yes  Striae: yes  Furuncless: no  Acne: no  Intertrigo: o  Lower Leg ulcers: no      Physical Exam  /84 (BP Location: Right arm, Patient Position: Sitting, Cuff Size: Adult Large)   Ht 1.63 m (5' 4.17\")   Wt 124.3 kg (274 lb)   Breastfeeding No   BMI 46.78 kg/m          General Appearance  No acute distress. Obesity: central  Alert: yes  Sleepy: no  HEENT  PERRLA, EOMI  Neck  Stout: 15\" No carotid bruits  Airway: 4+  Cardiovascular  Rhythm regular Rate Regular  Murmur: no  Pulmonary  [place] Score: 10  Lungs clear to ascultation  Abdomen  No rashes.   Post surgical Scars: c-s  Extremities:  Pitting edema: trace  Palpable distal pulses: 2+  Varicose veins: " no  Neurologic  Tremors: no  Psychiatric  Thought Content Organized  Mood appears stable  Endocrine  Moon Facies: NO  Dorsal Thoracic Prominence: NO  Skin tags: yes  Acanthosis nigricans: no  Dermatologic  Intertrigo: no            Total time spent on the date of this encounter doing: chart review, review of test results, patient visit, physical exam, education, counseling, developing plan of care, and documenting = 60 minutes.

## 2021-09-27 ENCOUNTER — TELEPHONE (OUTPATIENT)
Dept: SURGERY | Facility: CLINIC | Age: 31
End: 2021-09-27

## 2021-09-27 ENCOUNTER — VIRTUAL VISIT (OUTPATIENT)
Dept: SURGERY | Facility: CLINIC | Age: 31
End: 2021-09-27
Payer: COMMERCIAL

## 2021-09-27 DIAGNOSIS — E66.813 CLASS 3 DRUG-INDUCED OBESITY WITHOUT SERIOUS COMORBIDITY WITH BODY MASS INDEX (BMI) OF 45.0 TO 49.9 IN ADULT (H): Primary | ICD-10-CM

## 2021-09-27 DIAGNOSIS — E66.1 CLASS 3 DRUG-INDUCED OBESITY WITHOUT SERIOUS COMORBIDITY WITH BODY MASS INDEX (BMI) OF 45.0 TO 49.9 IN ADULT (H): Primary | ICD-10-CM

## 2021-09-27 DIAGNOSIS — Z71.3 NUTRITIONAL COUNSELING: ICD-10-CM

## 2021-09-27 PROCEDURE — 97802 MEDICAL NUTRITION INDIV IN: CPT | Mod: GT | Performed by: DIETITIAN, REGISTERED

## 2021-09-27 NOTE — LETTER
9/27/2021         RE: Eloina Ann  [address] [facility] [address]        Dear Colleague,    Thank you for referring your patient, Eloina Ann, to the [facility] SURGERY CLINIC AND BARIATRICS CARE [place]. Please see a copy of my visit note below.    Eloina Ann is a 31 year old who is being evaluated via a billable video visit.      How would you like to obtain your AVS? MyChart  If the video visit is dropped, the invitation should be resent by: Send to e-mail at: [email or web address]  Will anyone else be joining your video visit? No      Video Start Time: 1:22 PM      Medical Weight Loss Initial Diet Evaluation  Assessment:  Eloina is presenting today for a new weight management nutrition consultation. Pt has had an initial appointment with Dr. Shell.  Weight loss medication: Phentermine.       Anthropometrics:    Current Weight: 274 lbs   BMI: There is no height or weight on file to calculate BMI.   Ideal body weight: 55.1 kg (121 lb 7.5 oz)  Adjusted ideal body weight: 82.8 kg (182 lb 7.7 oz)    Estimated RMR ([place]-St Jeor equation):  1955 kcals x 1.3 (light active) = 2542 kcals (for weight maintenance)    Recommended Protein Intake: 60-80 grams of protein/day    Medical History:  Patient Active Problem List   Diagnosis     Not immune to hepatitis B virus     Hypothyroidism     Esophageal reflux     Eczema     Dysplasia of cervix, low grade (SOCRATES 1)     Cervical cancer screening     Thyroid disease     Morbid obesity (H)     Ankle pain     Low back pain     Hirsutism     Multiple acquired skin tags      Diabetes: No   HbA1c:  No results found for: HGBA1C    Nutrition History:   Food allergies/intolerances/cultural or religous food customs: No     Vitamins/Mineral Supplementation: nothing currently-MD recommends Vitamin B12 and Vitamin D    Dietary Recall:  Breakfast: egg scramble   Lunch: sandwich (deli meat, cheese, pickle) or leftovers from the night before  Dinner:  meat, potato, vegetable   Typical Snacks: usually later in the afternoon-chips or popcorn    Overnight eating: No     Eating out: 4 times/week, trying to reduce frequency      Beverages: Coffee 1-2 cups/day, Water, Diet Coke     Exercise: no set regimen due to knee issues, just started Physical Therapy as of last week    Nutrition Diagnosis (PES statement):   Overweight/Obesity (NC 3.3) related to overeating and poor lifestyle habits as evidenced by patient report of excessive energy intake, lack of exercise, and BMI of 46.78 kg/m2.     Nutrition Intervention  1. Food and/or Nutrient Delivery   a. Placed emphasis on importance of developing a healthy meal routine, aiming for 3 meals a day and no snacks.  2. Nutrition Education   a. Discussed with patient how to build a meal: the importance of including a lean/low fat protein at each meal, include a source of vegetables at a minimum of lunch and dinner and limiting carbohydrate intake to <25% per meal.  b. Educated on sources of lean protein, portion sizes, the amount of grams found in each source. Recommend patient to aim for 20-30g protein at each meal.  c. Educated on how to read a food label: keeping total fat <10g and sugar <10g per serving.  d. Discussed the importance of adequate hydration, with emphasis on drinking 64oz of water or zero calorie beverages per day.    3. Nutrition Counseling   a. Encouraged importance of developing routine exercise for health benefits and weight loss.    Goals established by patient:   1. Start tracking intake via food journal.   2. Focus on protein first, aiming for 20-30 grams of protein/meal, 3 meals/day.   3. Increase water consumption, aiming for 64 oz/day.   4. Start thinking about options to help establish an exercise regimen.     Handouts provided:  Lean Protein Sources   Snack Ideas    Assessment/Plan:    Pt will follow up in 2 month(s) with bariatrician and 1 month(s) with dietitian.       Video-Visit Details    Type  of service:  Video Visit    Video End Time:1:53 PM    Originating Location (pt. Location): Home    Distant Location (provider location):  [facility] SURGERY [facility] AND BARIATRICS [facility]     Platform used for Video Visit: Micky Dumont RD        Again, thank you for allowing me to participate in the care of your patient.        Sincerely,        Apple Dumont RD

## 2021-09-27 NOTE — PROGRESS NOTES
Eloina Ann is a 31 year old who is being evaluated via a billable video visit.      How would you like to obtain your AVS? MyChart  If the video visit is dropped, the invitation should be resent by: Send to e-mail at: [email or web address]  Will anyone else be joining your video visit? No      Video Start Time: 1:22 PM      Medical Weight Loss Initial Diet Evaluation  Assessment:  Eloina is presenting today for a new weight management nutrition consultation. Pt has had an initial appointment with Dr. Shell.  Weight loss medication: Phentermine.       Anthropometrics:    Current Weight: 274 lbs   BMI: There is no height or weight on file to calculate BMI.   Ideal body weight: 55.1 kg (121 lb 7.5 oz)  Adjusted ideal body weight: 82.8 kg (182 lb 7.7 oz)    Estimated RMR ([place]-St Jeor equation):  1955 kcals x 1.3 (light active) = 2542 kcals (for weight maintenance)    Recommended Protein Intake: 60-80 grams of protein/day    Medical History:  Patient Active Problem List   Diagnosis     Not immune to hepatitis B virus     Hypothyroidism     Esophageal reflux     Eczema     Dysplasia of cervix, low grade (SOCRATES 1)     Cervical cancer screening     Thyroid disease     Morbid obesity (H)     Ankle pain     Low back pain     Hirsutism     Multiple acquired skin tags      Diabetes: No   HbA1c:  No results found for: HGBA1C    Nutrition History:   Food allergies/intolerances/cultural or religous food customs: No     Vitamins/Mineral Supplementation: nothing currently-MD recommends Vitamin B12 and Vitamin D    Dietary Recall:  Breakfast: egg scramble   Lunch: sandwich (deli meat, cheese, pickle) or leftovers from the night before  Dinner: meat, potato, vegetable   Typical Snacks: usually later in the afternoon-chips or popcorn    Overnight eating: No     Eating out: 4 times/week, trying to reduce frequency      Beverages: Coffee 1-2 cups/day, Water, Diet Coke     Exercise: no set regimen due to knee issues, just  started Physical Therapy as of last week    Nutrition Diagnosis (PES statement):   Overweight/Obesity (NC 3.3) related to overeating and poor lifestyle habits as evidenced by patient report of excessive energy intake, lack of exercise, and BMI of 46.78 kg/m2.     Nutrition Intervention  1. Food and/or Nutrient Delivery   a. Placed emphasis on importance of developing a healthy meal routine, aiming for 3 meals a day and no snacks.  2. Nutrition Education   a. Discussed with patient how to build a meal: the importance of including a lean/low fat protein at each meal, include a source of vegetables at a minimum of lunch and dinner and limiting carbohydrate intake to <25% per meal.  b. Educated on sources of lean protein, portion sizes, the amount of grams found in each source. Recommend patient to aim for 20-30g protein at each meal.  c. Educated on how to read a food label: keeping total fat <10g and sugar <10g per serving.  d. Discussed the importance of adequate hydration, with emphasis on drinking 64oz of water or zero calorie beverages per day.    3. Nutrition Counseling   a. Encouraged importance of developing routine exercise for health benefits and weight loss.    Goals established by patient:   1. Start tracking intake via food journal.   2. Focus on protein first, aiming for 20-30 grams of protein/meal, 3 meals/day.   3. Increase water consumption, aiming for 64 oz/day.   4. Start thinking about options to help establish an exercise regimen.     Handouts provided:  Lean Protein Sources   Snack Ideas    Assessment/Plan:    Pt will follow up in 2 month(s) with bariatrician and 1 month(s) with dietitian.       Video-Visit Details    Type of service:  Video Visit    Video End Time:1:53 PM    Originating Location (pt. Location): Home    Distant Location (provider location):  [facility] SURGERY [facility] AND BARIATRICS CARE [place]     Platform used for Video Visit: Micky Dumont RD

## 2021-10-04 ENCOUNTER — VIRTUAL VISIT (OUTPATIENT)
Dept: SURGERY | Facility: CLINIC | Age: 31
End: 2021-10-04
Payer: COMMERCIAL

## 2021-10-04 DIAGNOSIS — E66.9 OBESITY: Primary | ICD-10-CM

## 2021-10-04 PROCEDURE — 99207 PR MEDICAL WEIGHT MANAGEMENT PROGRAM ENROLLMENT: CPT

## 2021-10-04 NOTE — PROGRESS NOTES
Reason for Visit: 24-Week Healthy Lifestyle Plan Initial Visit - Health Coaching    Progress Notes:  New 24-Week Healthy Lifestyle Plan Weight Management Health Coaching Note  Eloina Ann   MRN: [id number]  : 1990  JOCELYN: 10/04/2021    Initial Health  Visit #1 ~ Phone Visit    ASSESSMENT:  Initial Start Date:  2021  Graduation Date:  April/May 2022  [employer] (online resource) enrollment date(s):  2021  Physician:  Dr. Shell  Initial Weight (lbs): 274 lbs.  Current Weight (lbs): 274 lbs.  Average Daily Water (ounces): - aiming for 64 oz/day (opportunity)  Weight Loss Medication: phentermine (going well & feeling higher energy)  Nutrition Plan: real whole foods       PATIENT INFORMATION PROVIDED via email sent by Health :  1.) 24 Week Healthy Lifestyle Plan Overview:  Explained the structure of the 24-week plan, reviewed scheduling information including the main scheduling phone number [phone], discussed all coaching sessions will be done via phone.  2.) [employer] - Online resource available to patient on the 1st day of the month following start date with Health .  Patient will receive a Welcome email from [employer] with their user name and password.  Patient will have full access to [employer] for a duration of 7 months.  This online resource will be continued if patient purchases the 24-week Extension which includes 3, 30-min. Health and Wellness Coaching appointments.  3.) Support Group monthly topics, dates/times - Flyer with more information provided by the Health  (see end of note for the current list)  4.) Explain the role of a Health  & the FOUR Pillars of Health (Nutrition, Exercise/Activity/Movement, Sleep and Stress Management)      INITIAL INTAKE: (will do this at visit #2)  The four pillars of well-being may impact your ability to manage weight.   Level of Satisfaction:  Rate your current level of satisfaction on a scale of 1-10 in each  pillar.     Nutrition (1 -10):     Movement/Activity (1 -10):     Sleep (1 -10):     Stress Management (1 -10):     Questions: Use all or some of the following questions to get to know what the patient is wanting for herself/himself. Ask,  Is this a question that would be important to talk about for you?   1.) Which of the pillars are you wanting to focus on first and why?   2.) What are some things you have learned along the way about yourself and weight loss?   3.) What is really solid about you or what can you really count on about yourself?   4.) Is there anything else that you would like me to know?       WELLNESS VISION: 5 minute Visioning Exercise (may be completed at visit #2/#3)    You may choose to close your eyes for this exercise. Start with three full breaths to bring your attention inward.    In your mind s eye, see yourself in the near future. You are your healthiest, happiest, and most true version of yourself. What do you see? What do you notice?     Invite patient to expand on this vision, and/or start  trying on  this vision this week.      Goal established with ANN Chiu on 9/27:   1. Start tracking intake via food journal - using a chart that her mom had created when they did WW together.  2. Focus on protein first, aiming for 20-30 grams of protein/meal, 3 meals/day.   3. Increase water consumption, aiming for 64 oz/day - opportunity to increase  4. Start thinking about options to help establish an exercise regimen.     Ideas:  Exercise:  There is a path 1/4 mile in length - close to home - challenging for knees, not paved  Carve out time at home to do floor exercises, crunchers, pushups against wall      NOTES:    Lives with boyfriend in [place], [place]  2.5 year old son  Dog who is 3 years old.  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 Shell f/u      Monthly Support Groups offered virtually via TEAMS.  Contact Edwina Bautista ([email or web address]) to be added to the invite list.  Friday, October 29, 12:30-1:30 pm:  Open Forum  Friday, November 19, 12:30-1:30 pm:  Gratitude  Friday, December 10, 12:30-1:30 pm:  Open Forum  2022 dates coming soon...       SIGNATURE:  NADEGE Poe, [facility]  National Board-Certified Health &   24 Week Healthy Lifestyle Program Health   [place] [facility]  [email or web address]

## 2021-10-04 NOTE — LETTER
10/4/2021         RE: Eloina Ann  [address] [address] [address]        Dear Colleague,    Thank you for referring your patient, Eloina Ann, to the [facility] SURGERY CLINIC AND BARIATRICS CARE [place]. Please see a copy of my visit note below.    Reason for Visit: 24-Week Healthy Lifestyle Plan Initial Visit - Health Coaching    Progress Notes:  New 24-Week Healthy Lifestyle Plan Weight Management Health Coaching Note  Eloina Ann   MRN: [id number]  : 1990  JOCELYN: 10/04/2021    Initial Health  Visit #1 ~ Phone Visit    ASSESSMENT:  Initial Start Date:  2021  Graduation Date:  April/May 2022  [employer] (online resource) enrollment date(s):  2021  Physician:  Dr. Shell  Initial Weight (lbs): 274 lbs.  Current Weight (lbs): 274 lbs.  Average Daily Water (ounces): - aiming for 64 oz/day (opportunity)  Weight Loss Medication: phentermine (going well & feeling higher energy)  Nutrition Plan: real whole foods       PATIENT INFORMATION PROVIDED via email sent by Health :  1.) 24 Week Healthy Lifestyle Plan Overview:  Explained the structure of the 24-week plan, reviewed scheduling information including the main scheduling phone number [phone], discussed all coaching sessions will be done via phone.  2.) [employer] - Online resource available to patient on the 1st day of the month following start date with Health .  Patient will receive a Welcome email from [employer] with their user name and password.  Patient will have full access to [employer] for a duration of 7 months.  This online resource will be continued if patient purchases the 24-week Extension which includes 3, 30-min. Health and Wellness Coaching appointments.  3.) Support Group monthly topics, dates/times - Flyer with more information provided by the Health  (see end of note for the current list)  4.) Explain the role of a Health  & the FOUR Pillars of Health  (Nutrition, Exercise/Activity/Movement, Sleep and Stress Management)      INITIAL INTAKE: (will do this at visit #2)  The four pillars of well-being may impact your ability to manage weight.   Level of Satisfaction:  Rate your current level of satisfaction on a scale of 1-10 in each pillar.     Nutrition (1 -10):     Movement/Activity (1 -10):     Sleep (1 -10):     Stress Management (1 -10):     Questions: Use all or some of the following questions to get to know what the patient is wanting for herself/himself. Ask,  Is this a question that would be important to talk about for you?   1.) Which of the pillars are you wanting to focus on first and why?   2.) What are some things you have learned along the way about yourself and weight loss?   3.) What is really solid about you or what can you really count on about yourself?   4.) Is there anything else that you would like me to know?       WELLNESS VISION: 5 minute Visioning Exercise (may be completed at visit #2/#3)    You may choose to close your eyes for this exercise. Start with three full breaths to bring your attention inward.    In your mind s eye, see yourself in the near future. You are your healthiest, happiest, and most true version of yourself. What do you see? What do you notice?     Invite patient to expand on this vision, and/or start  trying on  this vision this week.      Goal established with ANN Chiu on 9/27:   1. Start tracking intake via food journal - using a chart that her mom had created when they did WW together.  2. Focus on protein first, aiming for 20-30 grams of protein/meal, 3 meals/day.   3. Increase water consumption, aiming for 64 oz/day - opportunity to increase  4. Start thinking about options to help establish an exercise regimen.     Ideas:  Exercise:  There is a path 1/4 mile in length - close to home - challenging for knees, not paved  Carve out time at home to do floor exercises, crunchers, pushups against wall      NOTES:     Lives with boyfriend in [place], [place]  2.5 year old son  Dog who is 3 years old.  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 Shell f/u      Monthly Support Groups offered virtually via TEAMS.  Contact Edwina Bautista ([email or web address]) to be added to the invite list.  Friday, October 29, 12:30-1:30 pm:  Open Forum  Friday, November 19, 12:30-1:30 pm:  Gratitude  Friday, December 10, 12:30-1:30 pm:  Open Forum  2022 dates coming soon...       SIGNATURE:  NADEGE Poe, [facility], [facility]  National Board-Certified Health &   24 Week Healthy Lifestyle Program Health   [place] [facility]  [email or web address]        Again, thank you for allowing me to participate in the care of your patient.        Sincerely,        Bernie Rivas

## 2021-10-14 ENCOUNTER — TELEPHONE (OUTPATIENT)
Dept: FAMILY MEDICINE | Facility: CLINIC | Age: 31
End: 2021-10-14

## 2021-10-14 NOTE — TELEPHONE ENCOUNTER
Call attempt x2 for health and wellness coaching appt #2 as part of the 24-week Healthy Lifestyle Plan.    Sent email and [employer] message to offer assistance in getting the appt rescheduled.    May call the main scheduling line for assistance as well:  [phone].    NADEGE Poe, [facility]  National Board Certified Health &   24 Week Healthy Lifestyle Program Health   [facility] Weight Management Program  [phone]  email: [email or web address]

## 2021-10-24 ENCOUNTER — HEALTH MAINTENANCE LETTER (OUTPATIENT)
Age: 31
End: 2021-10-24

## 2021-11-02 ENCOUNTER — VIRTUAL VISIT (OUTPATIENT)
Dept: SURGERY | Facility: CLINIC | Age: 31
End: 2021-11-02
Payer: COMMERCIAL

## 2021-11-02 DIAGNOSIS — E66.813 CLASS 3 DRUG-INDUCED OBESITY WITHOUT SERIOUS COMORBIDITY WITH BODY MASS INDEX (BMI) OF 45.0 TO 49.9 IN ADULT (H): Primary | ICD-10-CM

## 2021-11-02 DIAGNOSIS — Z71.3 NUTRITIONAL COUNSELING: ICD-10-CM

## 2021-11-02 DIAGNOSIS — E66.1 CLASS 3 DRUG-INDUCED OBESITY WITHOUT SERIOUS COMORBIDITY WITH BODY MASS INDEX (BMI) OF 45.0 TO 49.9 IN ADULT (H): Primary | ICD-10-CM

## 2021-11-02 PROCEDURE — 97803 MED NUTRITION INDIV SUBSEQ: CPT | Mod: TEL | Performed by: DIETITIAN, REGISTERED

## 2021-11-02 NOTE — LETTER
11/2/2021         RE: Eloina Ann  [address] [address] [address]        Dear Colleague,    Thank you for referring your patient, Eloina Ann, to the [facility] SURGERY CLINIC AND BARIATRICS CARE [place]. Please see a copy of my visit note below.    Eloina Ann is a 31 year old who is being evaluated via a billable telephone visit.      What phone number would you like to be contacted at? [phone]  How would you like to obtain your AVS? [facility] Weight Loss Follow-Up Diet Evaluation  Assessment:  Eloina is presenting today for a follow up weight management nutrition consultation. Pt has had an initial appointment with Dr. Shell. This is patient's 2nd Dietitian visit of the 24 week program.   Weight loss medication: Phentermine.   Pt's weight is 274 lbs (patient will email this writer in regards to most recent weight)  Initial weight: 274 lbs       No flowsheet data found.  BMI: There is no height or weight on file to calculate BMI.  Ideal body weight: 55.1 kg (121 lb 7.5 oz)  Adjusted ideal body weight: 82.8 kg (182 lb 7.7 oz)    Estimated RMR ([place]-St Jeor equation):   1955 kcals x 1.3 (light active) = 2542 kcals (for weight maintenance)     Recommended Protein Intake: 60-80 grams of protein/day  Patient Active Problem List:  Patient Active Problem List   Diagnosis     Not immune to hepatitis B virus     Hypothyroidism     Esophageal reflux     Eczema     Dysplasia of cervix, low grade (SOCRATES 1)     Cervical cancer screening     Thyroid disease     Morbid obesity (H)     Ankle pain     Low back pain     Hirsutism     Multiple acquired skin tags     Diabetes: No     Progress on goals from last visit: Feels that the Phentermine has helped with cravings and always thinking about food.  Patient reports that she has been tracking her intake via food journal, noting that she has missed a couple days here and there.      Dietary Recall:  Breakfast: egg scramble or  cottage cheese and peaches or yogurt or hard boiled eggs   Lunch:sandwich or deli meat and cheese bites, carrots or deviled eggs or leftovers from the night before or taco salad (bringing in more lunch from home)   Dinner:tacos or taco salad or grilled chicken, rice and vegetables or spaghetti   Typical snacks: Phentermine is helping with reduced snack consumption-occasional chips or yogurt   Eating out: 2 times/week (reduced from 4 times/week)   Beverages: Water (working on increased consumption)   Exercise: Physical Therapy 1 time/week, home exercises in addition, floor exercises (sit ups, leg lifts)     Nutrition Diagnosis:    Overweight/Obesity (NC 3.3) related to overeating and poor lifestyle habits as evidenced by patient's subjective statements (excessive energy intake, lack of exercise) and BMI of 46.78 kg/m2.     Intervention:  1. Food and/or nutrient delivery: balanced meals, adequate protein   2. Nutrition education: [employer]  3. Nutrition counseling: exercise regimen     Monitoring/Evaluation:    Goals:  1. Aim for 64 oz of water daily.    2. Use white board to help plan ahead for meals (supper exclusively).    3. Incorporate exercise/activity on a daily basis.     Patient to follow up in 1 month(s) with ANN        Phone call duration: 30 minutes      Apple Dumont RD        Again, thank you for allowing me to participate in the care of your patient.        Sincerely,        Apple Dumont RD

## 2021-11-02 NOTE — PROGRESS NOTES
Eloina Ann is a 31 year old who is being evaluated via a billable telephone visit.      What phone number would you like to be contacted at? [phone]  How would you like to obtain your AVS? [facility] Weight Loss Follow-Up Diet Evaluation  Assessment:  Eloina is presenting today for a follow up weight management nutrition consultation. Pt has had an initial appointment with Dr. Shell. This is patient's 2nd Dietitian visit of the 24 week program.   Weight loss medication: Phentermine.   Pt's weight is 274 lbs (patient will email this writer in regards to most recent weight)  Initial weight: 274 lbs   Emailed Updated Weight (as of 10/20): 276.6 lbs     No flowsheet data found.  BMI: There is no height or weight on file to calculate BMI.  Ideal body weight: 55.1 kg (121 lb 7.5 oz)  Adjusted ideal body weight: 82.8 kg (182 lb 7.7 oz)    Estimated RMR (Isabela-St Jeor equation):   1955 kcals x 1.3 (light active) = 2542 kcals (for weight maintenance)     Recommended Protein Intake: 60-80 grams of protein/day  Patient Active Problem List:  Patient Active Problem List   Diagnosis     Not immune to hepatitis B virus     Hypothyroidism     Esophageal reflux     Eczema     Dysplasia of cervix, low grade (SOCRATES 1)     Cervical cancer screening     Thyroid disease     Morbid obesity (H)     Ankle pain     Low back pain     Hirsutism     Multiple acquired skin tags     Diabetes: No     Progress on goals from last visit: Feels that the Phentermine has helped with cravings and always thinking about food.  Patient reports that she has been tracking her intake via food journal, noting that she has missed a couple days here and there.      Dietary Recall:  Breakfast: egg scramble or cottage cheese and peaches or yogurt or hard boiled eggs   Lunch:sandwich or deli meat and cheese bites, carrots or deviled eggs or leftovers from the night before or taco salad (bringing in more lunch from home)   Dinner:tacos or taco  salad or grilled chicken, rice and vegetables or spaghetti   Typical snacks: Phentermine is helping with reduced snack consumption-occasional chips or yogurt   Eating out: 2 times/week (reduced from 4 times/week)   Beverages: Water (working on increased consumption)   Exercise: Physical Therapy 1 time/week, home exercises in addition, floor exercises (sit ups, leg lifts)     Nutrition Diagnosis:    Overweight/Obesity (NC 3.3) related to overeating and poor lifestyle habits as evidenced by patient's subjective statements (excessive energy intake, lack of exercise) and BMI of 46.78 kg/m2.     Intervention:  1. Food and/or nutrient delivery: balanced meals, adequate protein   2. Nutrition education: Rec[employer] Resources  3. Nutrition counseling: exercise regimen     Monitoring/Evaluation:    Goals:  1. Aim for 64 oz of water daily.    2. Use white board to help plan ahead for meals (supper exclusively).    3. Incorporate exercise/activity on a daily basis.     Patient to follow up in 1 month(s) with RD        Phone call duration: 30 minutes      Apple Dumont RD

## 2021-11-09 PROBLEM — R73.03 PREDIABETES: Status: ACTIVE | Noted: 2021-10-15

## 2021-11-09 RX ORDER — PANTOPRAZOLE SODIUM 20 MG/1
TABLET, DELAYED RELEASE ORAL
COMMUNITY
Start: 2021-10-01

## 2021-11-11 ENCOUNTER — OFFICE VISIT (OUTPATIENT)
Dept: SURGERY | Facility: CLINIC | Age: 31
End: 2021-11-11
Payer: COMMERCIAL

## 2021-11-11 VITALS
WEIGHT: 268 LBS | DIASTOLIC BLOOD PRESSURE: 80 MMHG | BODY MASS INDEX: 45.75 KG/M2 | SYSTOLIC BLOOD PRESSURE: 122 MMHG | HEIGHT: 64 IN

## 2021-11-11 DIAGNOSIS — R73.09 ELEVATED HEMOGLOBIN A1C: Primary | ICD-10-CM

## 2021-11-11 PROCEDURE — 99215 OFFICE O/P EST HI 40 MIN: CPT | Performed by: FAMILY MEDICINE

## 2021-11-11 ASSESSMENT — MIFFLIN-ST. JEOR: SCORE: 1928.34

## 2021-11-11 NOTE — PATIENT INSTRUCTIONS
"Wind-get winded  Water-drink water  Go when you need to go  Fiber-food 100%-raw fruit and veggies  Gentle Laxatives :Miralax, senokot, dulcolax  Milk of Magnesia  Mag Citrate  Suppository  Enema      If you are planning to have your surgery at [facility], please complete this online video seminar series.    Throughout this four-video series, you will be introduced to our Bariatric Care team, gain a better understanding of what obesity is, be introduced to the surgery process, review the benefits and risks of surgery, and see a glimpse of life after surgery.    Completion time of all four videos and quizzes is about one hour.     Start by clicking on the link below and under the heading \"Our Approach\" make sure you click \"READ MORE\" to see the full instructions for [facility] Weight Loss Surgery Seminar. PLEASE FOLLOW THE DIRECTIONS CAREFULLY!    [email or web address]    To access the videos and complete the quizzes, please follow these instructions:    1. Scroll to the bottom of the webpage to watch the first video (it starts with  speaking)  2. When done with the video, click on the link in the instructions on the webpage to take the first quiz.   3. Once you complete your first quiz, you will receive an email with links to the second video and quiz.  4. You will repeat step 3 for video and quizzes three and four.    Please let us know if you have any questions or issues.        For support group. E-mail [email or web address] and ask for the link to the next support group. Always the 2nd Tuesday of the month 6:30-8pm.    Next is Dec. 14th.  "

## 2021-11-11 NOTE — PROGRESS NOTES
"Bariatric Follow-up    31 year old  female BMI:Body mass index is 45.76 kg/m .    Weight:   Wt Readings from Last 1 Encounters:   11/11/21 121.6 kg (268 lb)    pounds  /80   Ht 1.63 m (5' 4.17\")   Wt 121.6 kg (268 lb)   BMI 45.76 kg/m        Comorbidities:  Patient Active Problem List   Diagnosis     Not immune to hepatitis B virus     Hypothyroidism     Esophageal reflux     Eczema     Dysplasia of cervix, low grade (SOCRATES 1)     Cervical cancer screening     Thyroid disease     Morbid obesity (H)     Ankle pain     Low back pain     Hirsutism     Multiple acquired skin tags     Prediabetes     Interim: Frustrated with slow weight loss. # down. Work is so demanding she has missed appointments and rescheduled. Wondering about bariatric surgery. Struggling with insulin resistance and now \"pre-diabetes\" at her last physical with A1c 5.9.     Plan:  DIET let dietitian know headed toward surgery. I will let her know as well.   EXERCISE walk the trails and continue floor exercises   PHARMACOTHERAPY add metformin which will help with insulin resistance, blood sugars and help bowels move more freely. Request a refill of your phentermine when you have a week or two left in the bottle    Surgery Info session is a non-committal way to check out bariatric surgeries. With insulin resistance suggest RYGB over sleeve gastrectomy. Sleeve can make GERD worse but the weight loss can sometimes make it better. Support group is another way to check out bariatric surgery patients experiences, thoughts, and issues in a noncommittal, educational way. Read through informational binder.    Pre op needs list would require you to be 274# or less at the time of surgery, to attend the support group once, to go through the online informational seminar (link provided in AVS) and [facility] Phone calls 2 weeks apart. We also do initial lab work. Your HCM is UTD. Follow through with sleep study. Actigall for 6 months post op. "      -We reviewed her medications and whether associated with weight gain.  Current Outpatient Medications   Medication     etonogestrel (NEXPLANON) 68 MG IMPL     levothyroxine (SYNTHROID/LEVOTHROID) 175 MCG tablet     metFORMIN (GLUCOPHAGE) 500 MG tablet     naproxen (NAPROSYN) 500 MG tablet     pantoprazole (PROTONIX) 20 MG EC tablet     phentermine (ADIPEX-P) 37.5 MG tablet     No current facility-administered medications for this visit.        We discussed [facility] Bariatric Basics including:  -eating 3 meals daily  -eating protein first  -eating slowly, chewing food well  -avoiding/limiting calorie containing beverages  -choosing wheat, not white with breads, crackers, pastas, deni, bagels, tortillas, rice  -limiting restaurant or cafeteria eating to twice a week or less  -We discussed the importance of restorative sleep and stress management in maintaining a healthy weight.  -We discussed insulin resistance and glycemic index as it relates to appetite and weight control  -We discussed the National Weight Control Registry healthy weight maintenance strategies and ways to optimize metabolism.  -We discussed the importance of physical activity including cardiovascular and strength training in maintaining a healthier weight and explored viable options.    Most recent labs:  Lab Results   Component Value Date    WBC 8.2 09/07/2018    HGB 10.4 (L) 09/07/2018    HCT 31.2 (L) 09/07/2018    MCV 90 09/07/2018     09/07/2018     Lab Results   Component Value Date    ALT 34 12/18/2006    AST 24 12/18/2006    ALKPHOS 116 12/18/2006       Lab Results   Component Value Date    TSH 1.44 09/06/2012       DIETARY HISTORY  Meals Per Day: 3  Eating Protein First?: yes  Food Diary: B:eggs,  L:leftovers or soup D:burger  Snacks Per Day: chips  Typical Snack: see above  Fluid Intake: intention  Portion Control: improved  Calorie Containing Beverages: no  Alcohol per week: 0-6  Typical Protein Food Choices:  "variety  Choosing Whole Grains: yes  Grocery Shopping is done by: herself  Food Preparation is done by: herself  Meals at Restaurant/Cafeteria/Take Out Per Week: 3  Eating at the Table: yes  TV is Off During Meals: yes    Positive Changes Since Last Visit: 6# down, stops eating when she is full  Struggling With:     Knowledgeable in Reading Food Labels:   Getting Adequate Protein: yes  Sleeping 7-8 hours/day 6-7  Stress management tough    PHYSICAL ACTIVITY PATTERNS:  Cardiovascular: walking  Strength Training: calithenics    REVIEW OF SYSTEMS  GENERAL/CONSTITUTIONAL:  Fatigue: stable  CARDIOVASCULAR:  Chest Pain with Exertion: no  PULMONARY:  Dyspnea on exertion: yes  CPAP Use: no  Tobacco Use: no  Asthma Controlled: NA  GASTROINTESTINAL:  GERD/Heartburn: on PPI  Gallbladder: present  UROLOGIC:  Urinary Symptoms: no  NEUROLOGIC:  Headaches: occ  Paresthesias: no  PSYCHIATRIC:  Moods: euthymic. Work is stressful  MUSCULOSKELETAL/RHEUMATOLOGIC  Arthralgias: yes. Better with Saucony shoes  Myalgias: yes  ENDOCRINE:  Monitoring Blood Sugars: no  Sugars Well Controlled: A1c 5.9  DERMATOLOGIC:  Rashes: no    PHYSICAL EXAM:  Vitals: /80   Ht 1.63 m (5' 4.17\")   Wt 121.6 kg (268 lb)   BMI 45.76 kg/m    GEN: Pleasant, well groomed, in no acute distress  EYES: EOMI,  ENT: airway 4+  NECK: no carotid bruits, no anterior/supraclavicular lymphadenopathy,   HEART: Rhythm regular, rate regular, no murmur   LUNGS: Clear  ABDOMEN: soft, non-tender, obese,   VASCULAR: no  lower extremity edema  MUSCULOSKELETAL:  muscle mass OK  SKIN:  no color changes of venous stasis, no ulcerations    Total time spent on the date of this encounter doing: chart review, review of test results, patient visit, physical exam, education, counseling, developing plan of care, and documenting = 40 minutes.          "

## 2021-11-11 NOTE — LETTER
"    11/11/2021         RE: Eloina Ann  [address] [address] [facility] [address]        Dear Colleague,    Thank you for referring your patient, Eloina Ann, to the [facility] SURGERY CL[facility]IC AND BARIATRICS CARE [place]. Please see a copy of my visit note below.    Bariatric Follow-up    31 year old  female BMI:Body mass index is 45.76 kg/m .    Weight:   Wt Readings from Last 1 Encounters:   11/11/21 121.6 kg (268 lb)    pounds  /80   Ht 1.63 m (5' 4.17\")   Wt 121.6 kg (268 lb)   BMI 45.76 kg/m        Comorbidities:  Patient Active Problem List   Diagnosis     Not immune to hepatitis B virus     Hypothyroidism     Esophageal reflux     Eczema     Dysplasia of cervix, low grade (SOCRATES 1)     Cervical cancer screening     Thyroid disease     Morbid obesity (H)     Ankle pain     Low back pain     Hirsutism     Multiple acquired skin tags     Prediabetes     Interim: Frustrated with slow weight loss. # down. Work is so demanding she has missed appointments and rescheduled. Wondering about bariatric surgery. Struggling with insulin resistance and now \"pre-diabetes\" at her last physical with A1c 5.9.     Plan:  DIET let dietitian know headed toward surgery. I will let her know as well.   EXERCISE walk the trails and continue floor exercises   PHARMACOTHERAPY add metformin which will help with insulin resistance, blood sugars and help bowels move more freely. Request a refill of your phentermine when you have a week or two left in the bottle    Surgery Info session is a non-committal way to check out bariatric surgeries. With insulin resistance suggest RYGB over sleeve gastrectomy. Sleeve can make GERD worse but the weight loss can sometimes make it better. Support group is another way to check out bariatric surgery patients experiences, thoughts, and issues in a noncommittal, educational way. Read through informational binder.    Pre op needs list would require you to be 274# or less at " the time of surgery, to attend the support group once, to go through the online informational seminar (link provided in AVS) and [facility] Phone calls 2 weeks apart. We also do initial lab work. Your HCM is UTD. Follow through with sleep study. Actigall for 6 months post op.      -We reviewed her medications and whether associated with weight gain.  Current Outpatient Medications   Medication     etonogestrel (NEXPLANON) 68 MG IMPL     levothyroxine (SYNTHROID/LEVOTHROID) 175 MCG tablet     metFORMIN (GLUCOPHAGE) 500 MG tablet     naproxen (NAPROSYN) 500 MG tablet     pantoprazole (PROTONIX) 20 MG EC tablet     phentermine (ADIPEX-P) 37.5 MG tablet     No current facility-administered medications for this visit.        We discussed [facility] Bariatric Basics including:  -eating 3 meals daily  -eating protein first  -eating slowly, chewing food well  -avoiding/limiting calorie containing beverages  -choosing wheat, not white with breads, crackers, pastas, deni, bagels, tortillas, rice  -limiting restaurant or cafeteria eating to twice a week or less  -We discussed the importance of restorative sleep and stress management in maintaining a healthy weight.  -We discussed insulin resistance and glycemic index as it relates to appetite and weight control  -We discussed the National Weight Control Registry healthy weight maintenance strategies and ways to optimize metabolism.  -We discussed the importance of physical activity including cardiovascular and strength training in maintaining a healthier weight and explored viable options.    Most recent labs:  Lab Results   Component Value Date    WBC 8.2 09/07/2018    HGB 10.4 (L) 09/07/2018    HCT 31.2 (L) 09/07/2018    MCV 90 09/07/2018     09/07/2018     Lab Results   Component Value Date    ALT 34 12/18/2006    AST 24 12/18/2006    ALKPHOS 116 12/18/2006       Lab Results   Component Value Date    TSH 1.44 09/06/2012       DIETARY HISTORY  Meals Per Day:  "3  Eating Protein First?: yes  Food Diary: B:eggs,  L:leftovers or soup D:burger  Snacks Per Day: chips  Typical Snack: see above  Fluid Intake: intention  Portion Control: improved  Calorie Containing Beverages: no  Alcohol per week: 0-6  Typical Protein Food Choices: variety  Choosing Whole Grains: yes  Grocery Shopping is done by: herself  Food Preparation is done by: herself  Meals at Restaurant/Cafeteria/Take Out Per Week: 3  Eating at the Table: yes  TV is Off During Meals: yes    Positive Changes Since Last Visit: 6# down, stops eating when she is full  Struggling With:     Knowledgeable in Reading Food Labels:   Getting Adequate Protein: yes  Sleeping 7-8 hours/day 6-7  Stress management tough    PHYSICAL ACTIVITY PATTERNS:  Cardiovascular: walking  Strength Training: calithenics    REVIEW OF SYSTEMS  GENERAL/CONSTITUTIONAL:  Fatigue: stable  CARDIOVASCULAR:  Chest Pain with Exertion: no  PULMONARY:  Dyspnea on exertion: yes  CPAP Use: no  Tobacco Use: no  Asthma Controlled: NA  GASTROINTESTINAL:  GERD/Heartburn: on PPI  Gallbladder: present  UROLOGIC:  Urinary Symptoms: no  NEUROLOGIC:  Headaches: occ  Paresthesias: no  PSYCHIATRIC:  Moods: euthymic. Work is stressful  MUSCULOSKELETAL/RHEUMATOLOGIC  Arthralgias: yes. Better with Saucony shoes  Myalgias: yes  ENDOCRINE:  Monitoring Blood Sugars: no  Sugars Well Controlled: A1c 5.9  DERMATOLOGIC:  Rashes: no    PHYSICAL EXAM:  Vitals: /80   Ht 1.63 m (5' 4.17\")   Wt 121.6 kg (268 lb)   BMI 45.76 kg/m    GEN: Pleasant, well groomed, in no acute distress  EYES: EOMI,  ENT: airway 4+  NECK: no carotid bruits, no anterior/supraclavicular lymphadenopathy,   HEART: Rhythm regular, rate regular, no murmur   LUNGS: Clear  ABDOMEN: soft, non-tender, obese,   VASCULAR: no  lower extremity edema  MUSCULOSKELETAL:  muscle mass OK  SKIN:  no color changes of venous stasis, no ulcerations    Total time spent on the date of this encounter doing: chart review, " review of test results, patient visit, physical exam, education, counseling, developing plan of care, and documenting = 40 minutes.              Again, thank you for allowing me to participate in the care of your patient.        Sincerely,        Theresa Shell MD

## 2021-12-03 ENCOUNTER — VIRTUAL VISIT (OUTPATIENT)
Dept: SURGERY | Facility: CLINIC | Age: 31
End: 2021-12-03
Payer: COMMERCIAL

## 2021-12-03 DIAGNOSIS — E66.1 CLASS 3 DRUG-INDUCED OBESITY WITHOUT SERIOUS COMORBIDITY WITH BODY MASS INDEX (BMI) OF 45.0 TO 49.9 IN ADULT (H): Primary | ICD-10-CM

## 2021-12-03 DIAGNOSIS — E66.813 CLASS 3 DRUG-INDUCED OBESITY WITHOUT SERIOUS COMORBIDITY WITH BODY MASS INDEX (BMI) OF 45.0 TO 49.9 IN ADULT (H): Primary | ICD-10-CM

## 2021-12-03 DIAGNOSIS — Z71.3 NUTRITIONAL COUNSELING: ICD-10-CM

## 2021-12-03 PROCEDURE — 97803 MED NUTRITION INDIV SUBSEQ: CPT | Mod: TEL | Performed by: DIETITIAN, REGISTERED

## 2021-12-03 NOTE — LETTER
12/3/2021         RE: Eloina Ann  [address] [address] [facility] [address]        Dear Colleague,    Thank you for referring your patient, Eloina Ann, to the [facility] SURGERY CLINIC AND BARIATRICS CARE [place]. Please see a copy of my visit note below.    Eloina Ann is a 31 year old who is being evaluated via a billable telephone visit.      What phone number would you like to be contacted at? [phone]  How would you like to obtain your AVS? Mail a copy      Medical  Weight Loss Follow-Up Diet Evaluation  Assessment:  Eloina is presenting today for a follow up weight management nutrition consultation. Pt has had an initial appointment with Dr. Shell.  This is patient's 3rd Dietitian visit for the 24 week program.  Is considering Bariatric Surgery, if insurance would cover.    Weight loss medication: Phentermine. Metformin   Pt's weight is 264.4 lbs  Initial weight: 274 lbs  Weight change: 9.6 lbs (down)     Changes and Difficulties 11/11/2021   I have made the following changes to my diet since my last visit: ate better, ate out less, ate less   With regards to my diet, I am still struggling with: eating clean   I have made the following changes to my activity/exercise since my last visit: physical theropy. try walking or floor activities   With regards to my activity/exercise, I am still struggling with: time to do it     BMI: There is no height or weight on file to calculate BMI.  Ideal body weight: 55.1 kg (121 lb 7.3 oz)  Adjusted ideal body weight: 81.7 kg (180 lb 1.2 oz)    Estimated RMR ([place]elle-St Jeor equation):   1931 kcals x 1.3 (light active) = 2510 kcals (for weight maintenance)     Recommended Protein Intake: 60-80 grams of protein/day  Patient Active Problem List:  Patient Active Problem List   Diagnosis     Not immune to hepatitis B virus     Hypothyroidism     Esophageal reflux     Eczema     Dysplasia of cervix, low grade (SOCRATES 1)     Cervical cancer screening      Thyroid disease     Morbid obesity (H)     Ankle pain     Low back pain     Hirsutism     Multiple acquired skin tags     Prediabetes     Diabetes: No     Progress on goals from last visit: Continues to focus on protein at meals, noting that she continues to eat pretty much the same things for breakfast and lunch, noting that she has started to expand out for supper, noting that she has found some recipes that she has experimented with.  Patient reports that she has not been as consistent with tracking her intake via food journal, noting that a lot of time she forgets it at work.      Eating out: continues to limit, noting that she has been doing more planning, averaging 2 days/week  Beverages: water-continues to work on increased consumption (does better at home)   Exercise: floor exercises 3-4 times/week  Nutrition Diagnosis:    Overweight/Obesity (NC 3.3) related to overeating and poor lifestyle habits as evidenced by patient's subjective statements (excessive energy intake, lack of exercise) and BMI of 45.2 kg/m2.     Intervention:  1. Food and/or nutrient delivery: balanced meals, adequate protein   2. Nutrition education: food journal options, water intake   3. Nutrition counseling: exercise regimen    Monitoring/Evaluation:    Goals:  1. Track intake via food journal, try utilizing an duke vs paper for ease.  2. Increase water consumption, aiming for 64 oz/day.   3. Incorporate exercise/activity on a daily basis as able/tolerated. m     Patient to follow up in 1 month(s) with ANN        Phone call duration: 22 minutes    Apple Dumont RD        Again, thank you for allowing me to participate in the care of your patient.        Sincerely,        Apple Dumont RD

## 2021-12-03 NOTE — PROGRESS NOTES
Eloina Ann is a 31 year old who is being evaluated via a billable telephone visit.      What phone number would you like to be contacted at? [phone]  How would you like to obtain your AVS? Mail a copy      Medical  Weight Loss Follow-Up Diet Evaluation  Assessment:  Eloina is presenting today for a follow up weight management nutrition consultation. Pt has had an initial appointment with Dr. Shell.  This is patient's 3rd Dietitian visit for the 24 week program.  Is considering Bariatric Surgery, if insurance would cover.    Weight loss medication: Phentermine. Metformin   Pt's weight is 264.4 lbs  Initial weight: 274 lbs  Weight change: 9.6 lbs (down)     Changes and Difficulties 11/11/2021   I have made the following changes to my diet since my last visit: ate better, ate out less, ate less   With regards to my diet, I am still struggling with: eating clean   I have made the following changes to my activity/exercise since my last visit: physical theropy. try walking or floor activities   With regards to my activity/exercise, I am still struggling with: time to do it     BMI: There is no height or weight on file to calculate BMI.  Ideal body weight: 55.1 kg (121 lb 7.3 oz)  Adjusted ideal body weight: 81.7 kg (180 lb 1.2 oz)    Estimated RMR ([place]-St Jeor equation):   1931 kcals x 1.3 (light active) = 2510 kcals (for weight maintenance)     Recommended Protein Intake: 60-80 grams of protein/day  Patient Active Problem List:  Patient Active Problem List   Diagnosis     Not immune to hepatitis B virus     Hypothyroidism     Esophageal reflux     Eczema     Dysplasia of cervix, low grade (SOCRATES 1)     Cervical cancer screening     Thyroid disease     Morbid obesity (H)     Ankle pain     Low back pain     Hirsutism     Multiple acquired skin tags     Prediabetes     Diabetes: No     Progress on goals from last visit: Continues to focus on protein at meals, noting that she continues to eat pretty much the  same things for breakfast and lunch, noting that she has started to expand out for supper, noting that she has found some recipes that she has experimented with.  Patient reports that she has not been as consistent with tracking her intake via food journal, noting that a lot of time she forgets it at work.      Eating out: continues to limit, noting that she has been doing more planning, averaging 2 days/week  Beverages: water-continues to work on increased consumption (does better at home)   Exercise: floor exercises 3-4 times/week  Nutrition Diagnosis:    Overweight/Obesity (NC 3.3) related to overeating and poor lifestyle habits as evidenced by patient's subjective statements (excessive energy intake, lack of exercise) and BMI of 45.2 kg/m2.     Intervention:  1. Food and/or nutrient delivery: balanced meals, adequate protein   2. Nutrition education: food journal options, water intake   3. Nutrition counseling: exercise regimen    Monitoring/Evaluation:    Goals:  1. Track intake via food journal, try utilizing an duke vs paper for ease.  2. Increase water consumption, aiming for 64 oz/day.   3. Incorporate exercise/activity on a daily basis as able/tolerated. m     Patient to follow up in 1 month(s) with RD        Phone call duration: 22 minutes    Apple Dumont RD

## 2022-01-07 ENCOUNTER — VIRTUAL VISIT (OUTPATIENT)
Dept: SURGERY | Facility: CLINIC | Age: 32
End: 2022-01-07
Payer: COMMERCIAL

## 2022-01-07 DIAGNOSIS — E66.813 CLASS 3 DRUG-INDUCED OBESITY WITHOUT SERIOUS COMORBIDITY WITH BODY MASS INDEX (BMI) OF 45.0 TO 49.9 IN ADULT (H): Primary | ICD-10-CM

## 2022-01-07 DIAGNOSIS — R63.2 HYPERPHAGIA: ICD-10-CM

## 2022-01-07 DIAGNOSIS — Z71.3 NUTRITIONAL COUNSELING: ICD-10-CM

## 2022-01-07 DIAGNOSIS — E66.1 CLASS 3 DRUG-INDUCED OBESITY WITHOUT SERIOUS COMORBIDITY WITH BODY MASS INDEX (BMI) OF 45.0 TO 49.9 IN ADULT (H): Primary | ICD-10-CM

## 2022-01-07 PROCEDURE — 97803 MED NUTRITION INDIV SUBSEQ: CPT | Mod: TEL | Performed by: DIETITIAN, REGISTERED

## 2022-01-07 NOTE — PROGRESS NOTES
Eloina Ann is a 31 year old who is being evaluated via a billable telephone visit.      What phone number would you like to be contacted at? [phone]  How would you like to obtain your AVS? [facility]  Weight Loss Follow-Up Diet Evaluation  Assessment:  Eloina is presenting today for a follow up weight management nutrition consultation. Pt has had an initial appointment with Dr. Shell.  This is patient's 4th Dietitian visit for the 24 week program.  Is considering Bariatric Surgery, if insurance would cover.   Weight loss medication: Phentermine. Metformin.   Pt's weight is 263.8 lbs   Initial weight: 274 lbs   Weight change: 10.2 lbs (down)    Changes and Difficulties 11/11/2021   I have made the following changes to my diet since my last visit: ate better, ate out less, ate less   With regards to my diet, I am still struggling with: eating clean   I have made the following changes to my activity/exercise since my last visit: physical theropy. try walking or floor activities   With regards to my activity/exercise, I am still struggling with: time to do it     BMI: There is no height or weight on file to calculate BMI.  Ideal body weight: 55.1 kg (121 lb 7.3 oz)  Adjusted ideal body weight: 81.7 kg (180 lb 1.2 oz)    Estimated RMR ([place]-St Jeor equation):   1931 kcals x 1.3 (light active) = 2510 kcals (for weight maintenance)     Recommended Protein Intake: 60-80 grams of protein/day  Patient Active Problem List:  Patient Active Problem List   Diagnosis     Not immune to hepatitis B virus     Hypothyroidism     Esophageal reflux     Eczema     Dysplasia of cervix, low grade (SOCRATES 1)     Cervical cancer screening     Thyroid disease     Morbid obesity (H)     Ankle pain     Low back pain     Hirsutism     Multiple acquired skin tags     Prediabetes     Diabetes: No     Progress on goals from last visit: Notes that she does admit to falling off track over the past month with diet and exercise,  eating out more frequently and exercising less.      Dietary Recall:  Breakfast: scrambled eggs   Lunch:sandwich or eating out (delivery or going to get something)   Dinner:eating out or spaghetti or meatloaf or pot roast or hamburger helper  Typical snacks: feeling always hungry-noting that she has been snacking on chips, candy, carrots  Eating out: 4 days/week   Beverages: Water, Vitamin Water Zero, Coffee  Exercise: floor exercises 2 times/week for 30 minutes    Nutrition Diagnosis:    Overweight/Obesity (NC 3.3) related to overeating and poor lifestyle habits as evidenced by patient's subjective statements (excessive energy intake, lack of exercise) and BMI of 45.2 kg/m2.     Intervention:  1. Food and/or nutrient delivery: balanced meals, adequate protein   2. Nutrition education: getting back on track with nutrition   3. Nutrition counseling: exercise regimen      Monitoring/Evaluation:    Goals:  1. Work on infusing water with fruit to help increase overall water consumption.   2. Attend support group next week.    3. Get back on track with diet and exercise habits.     Patient to follow up in 1 month(s) with RD      Phone call duration: 24 minutes      Apple Dumont RD

## 2022-01-07 NOTE — LETTER
1/7/2022         RE: Eloina Ann  [address] [address] [address]        Dear Colleague,    Thank you for referring your patient, Eloina Ann, to the [facility] SURGERY CLINIC AND BARIATRICS CARE [place]. Please see a copy of my visit note below.    Eloina Ann is a 31 year old who is being evaluated via a billable telephone visit.      What phone number would you like to be contacted at? [phone]  How would you like to obtain your AVS? [facility]      Medical  Weight Loss Follow-Up Diet Evaluation  Assessment:  Eloina is presenting today for a follow up weight management nutrition consultation. Pt has had an initial appointment with Dr. Shell.  This is patient's 4th Dietitian visit for the 24 week program.  Is considering Bariatric Surgery, if insurance would cover.   Weight loss medication: Phentermine. Metformin.   Pt's weight is 264.4 lbs (last weight recorded, unable to weigh self)  Initial weight: 274 lbs   Weight change: 9.6 lbs (down)     Changes and Difficulties 11/11/2021   I have made the following changes to my diet since my last visit: ate better, ate out less, ate less   With regards to my diet, I am still struggling with: eating clean   I have made the following changes to my activity/exercise since my last visit: physical theropy. try walking or floor activities   With regards to my activity/exercise, I am still struggling with: time to do it     BMI: There is no height or weight on file to calculate BMI.  Ideal body weight: 55.1 kg (121 lb 7.3 oz)  Adjusted ideal body weight: 81.7 kg (180 lb 1.2 oz)    Estimated RMR ([place]-St Jeor equation):   1931 kcals x 1.3 (light active) = 2510 kcals (for weight maintenance)     Recommended Protein Intake: 60-80 grams of protein/day  Patient Active Problem List:  Patient Active Problem List   Diagnosis     Not immune to hepatitis B virus     Hypothyroidism     Esophageal reflux     Eczema     Dysplasia of cervix, low grade  (SOCRATES 1)     Cervical cancer screening     Thyroid disease     Morbid obesity (H)     Ankle pain     Low back pain     Hirsutism     Multiple acquired skin tags     Prediabetes     Diabetes: No     Progress on goals from last visit: Notes that she does admit to falling off track over the past month with diet and exercise, eating out more frequently and exercising less.      Dietary Recall:  Breakfast: scrambled eggs   Lunch:sandwich or eating out (delivery or going to get something)   Dinner:eating out or spaghetti or meatloaf or pot roast or hamburger helper  Typical snacks: feeling always hungry-noting that she has been snacking on chips, candy, carrots  Eating out: 4 days/week   Beverages: Water, Vitamin Water Zero, Coffee  Exercise: floor exercises 2 times/week for 30 minutes    Nutrition Diagnosis:    Overweight/Obesity (NC 3.3) related to overeating and poor lifestyle habits as evidenced by patient's subjective statements (excessive energy intake, lack of exercise) and BMI of 45.2 kg/m2.     Intervention:  1. Food and/or nutrient delivery: balanced meals, adequate protein   2. Nutrition education: getting back on track with nutrition   3. Nutrition counseling: exercise regimen      Monitoring/Evaluation:    Goals:  1. Work on infusing water with fruit to help increase overall water consumption.   2. Attend support group next week.    3. Get back on track with diet and exercise habits.     Patient to follow up in 1 month(s) with ANN      Phone call duration: 24 minutes      Apple Dumont RD        Again, thank you for allowing me to participate in the care of your patient.        Sincerely,        Apple Dumont RD

## 2022-02-14 ENCOUNTER — VIRTUAL VISIT (OUTPATIENT)
Dept: SURGERY | Facility: CLINIC | Age: 32
End: 2022-02-14
Payer: COMMERCIAL

## 2022-02-14 DIAGNOSIS — E66.813 CLASS 3 DRUG-INDUCED OBESITY WITHOUT SERIOUS COMORBIDITY WITH BODY MASS INDEX (BMI) OF 40.0 TO 44.9 IN ADULT (H): Primary | ICD-10-CM

## 2022-02-14 DIAGNOSIS — Z71.3 NUTRITIONAL COUNSELING: ICD-10-CM

## 2022-02-14 DIAGNOSIS — R63.2 HYPERPHAGIA: ICD-10-CM

## 2022-02-14 DIAGNOSIS — E66.1 CLASS 3 DRUG-INDUCED OBESITY WITHOUT SERIOUS COMORBIDITY WITH BODY MASS INDEX (BMI) OF 40.0 TO 44.9 IN ADULT (H): Primary | ICD-10-CM

## 2022-02-14 PROCEDURE — 97803 MED NUTRITION INDIV SUBSEQ: CPT | Mod: TEL | Performed by: DIETITIAN, REGISTERED

## 2022-02-14 NOTE — LETTER
2/14/2022         RE: Eloina Ann  [address] [address] [facility] [address]        Dear Colleague,    Thank you for referring your patient, Eloina Ann, to the [facility] SURGERY CLINIC AND BARIATRICS CARE [place]. Please see a copy of my visit note below.    Eloina Ann is a 31 year old who is being evaluated via a billable telephone visit.      What phone number would you like to be contacted at? [phone]  How would you like to obtain your AVS? [facility]    Medical  Weight Loss Follow-Up Diet Evaluation  Assessment:  Eloina is presenting today for a follow up weight management nutrition consultation. Pt has had an initial appointment with Dr. Shell. This is patient's 5th Dietitian Visit for the 24 week program.  Is considering Bariatric Surgery, if insurance will allow.  Did attend support group more recently.    Weight loss medication: Phentermine. Metformin  Pt's weight is 260 lbs   Initial weight: 274 lbs   Weight change: 14 lbs (down)     Changes and Difficulties 11/11/2021   I have made the following changes to my diet since my last visit: ate better, ate out less, ate less   With regards to my diet, I am still struggling with: eating clean   I have made the following changes to my activity/exercise since my last visit: physical theropy. try walking or floor activities   With regards to my activity/exercise, I am still struggling with: time to do it     BMI: There is no height or weight on file to calculate BMI.  Patient weight not recorded    Estimated RMR ([place]-St Jeor equation):   1894 kcals x 1.3 (light active) = 2462 kcals (for weight maintenance)     Recommended Protein Intake: 60-80 grams of protein/day  Patient Active Problem List:  Patient Active Problem List   Diagnosis     Not immune to hepatitis B virus     Hypothyroidism     Esophageal reflux     Eczema     Dysplasia of cervix, low grade (SOCRATES 1)     Cervical cancer screening     Thyroid disease     Morbid obesity  (H)     Ankle pain     Low back pain     Hirsutism     Multiple acquired skin tags     Prediabetes     Diabetes: No    Progress on goals from last visit: Had lots of questions in regards to Bariatric Surgery, leaning towards that.  Patient reports that she is getting over COVID, had it in January.  Patient reports that since, she has gotten back on track with nutrition and exercise and feels she is doing much better.      Dietary Recall:  Breakfast: Herbalife Shake or egg scrambled or breakfast sandwich    Lunch:leftovers or sandwich (deli meat and cheese) or taco salad or spaghetti   Dinner:trying to do more grilled meats (chicken or steak or pork loin) and veggies   Typical snacks: has been working on reduced consumption, especially now that she is back on the medications  Eating out: has been working on reduced consumption throughout the week  Beverages: Water (has purchased a water bottle to help increase consumption), occasional Diet Pop  Exercise: Floor Exercises striving for daily for 30 minutes.     Nutrition Diagnosis:    Overweight/Obesity (NC 3.3) related to overeating and poor lifestyle habits as evidenced by patient's subjective statements (excessive energy intake) and BMI of  44.5 kg/m2.       Intervention:  1. Food and/or nutrient delivery: balanced meals, adequate protein   2. Nutrition education: discussed Bariatric Surgery process per patient's request   3. Nutrition counseling: exercise regimen     Monitoring/Evaluation:    Goals:  1. Continue to focus on protein first, aiming for 60-80 grams of protein/day.   2. Continue to work on increased water consumption, aiming for 64 oz/day.   3. Continue to try and exercise most days of the week for at least 30 minutes.     Patient to follow up in 1 month(s) with RD        Phone call duration: 30 minutes      Apple Dumont RD        Again, thank you for allowing me to participate in the care of your patient.        Sincerely,        Apple GONZALEZ  Lausted, RD

## 2022-02-14 NOTE — PROGRESS NOTES
Eloina Ann is a 31 year old who is being evaluated via a billable telephone visit.      What phone number would you like to be contacted at? [phone]  How would you like to obtain your AVS? [facility]    Medical  Weight Loss Follow-Up Diet Evaluation  Assessment:  Eloina is presenting today for a follow up weight management nutrition consultation. Pt has had an initial appointment with Dr. Shell. This is patient's 5th Dietitian Visit for the 24 week program.  Is considering Bariatric Surgery, if insurance will allow.  Did attend support group more recently.    Weight loss medication: Phentermine. Metformin  Pt's weight is 260 lbs   Initial weight: 274 lbs   Weight change: 14 lbs (down)     Changes and Difficulties 11/11/2021   I have made the following changes to my diet since my last visit: ate better, ate out less, ate less   With regards to my diet, I am still struggling with: eating clean   I have made the following changes to my activity/exercise since my last visit: physical theropy. try walking or floor activities   With regards to my activity/exercise, I am still struggling with: time to do it     BMI: There is no height or weight on file to calculate BMI.  Patient weight not recorded    Estimated RMR ([place]-St Jeor equation):   1894 kcals x 1.3 (light active) = 2462 kcals (for weight maintenance)     Recommended Protein Intake: 60-80 grams of protein/day  Patient Active Problem List:  Patient Active Problem List   Diagnosis     Not immune to hepatitis B virus     Hypothyroidism     Esophageal reflux     Eczema     Dysplasia of cervix, low grade (SOCRATES 1)     Cervical cancer screening     Thyroid disease     Morbid obesity (H)     Ankle pain     Low back pain     Hirsutism     Multiple acquired skin tags     Prediabetes     Diabetes: No    Progress on goals from last visit: Had lots of questions in regards to Bariatric Surgery, leaning towards that.  Patient reports that she is getting over COVID,  had it in January.  Patient reports that since, she has gotten back on track with nutrition and exercise and feels she is doing much better.      Dietary Recall:  Breakfast: Herbalife Shake or egg scrambled or breakfast sandwich    Lunch:leftovers or sandwich (deli meat and cheese) or taco salad or spaghetti   Dinner:trying to do more grilled meats (chicken or steak or pork loin) and veggies   Typical snacks: has been working on reduced consumption, especially now that she is back on the medications  Eating out: has been working on reduced consumption throughout the week  Beverages: Water (has purchased a water bottle to help increase consumption), occasional Diet Pop  Exercise: Floor Exercises striving for daily for 30 minutes.     Nutrition Diagnosis:    Overweight/Obesity (NC 3.3) related to overeating and poor lifestyle habits as evidenced by patient's subjective statements (excessive energy intake) and BMI of  44.5 kg/m2.       Intervention:  1. Food and/or nutrient delivery: balanced meals, adequate protein   2. Nutrition education: discussed Bariatric Surgery process per patient's request   3. Nutrition counseling: exercise regimen     Monitoring/Evaluation:    Goals:  1. Continue to focus on protein first, aiming for 60-80 grams of protein/day.   2. Continue to work on increased water consumption, aiming for 64 oz/day.   3. Continue to try and exercise most days of the week for at least 30 minutes.     Patient to follow up in 1 month(s) with RD        Phone call duration: 30 minutes      Apple Dumont RD

## 2022-03-21 ENCOUNTER — VIRTUAL VISIT (OUTPATIENT)
Dept: SURGERY | Facility: CLINIC | Age: 32
End: 2022-03-21
Payer: COMMERCIAL

## 2022-03-21 DIAGNOSIS — Z71.3 NUTRITIONAL COUNSELING: ICD-10-CM

## 2022-03-21 DIAGNOSIS — R63.2 HYPERPHAGIA: ICD-10-CM

## 2022-03-21 DIAGNOSIS — E66.1 CLASS 3 DRUG-INDUCED OBESITY WITHOUT SERIOUS COMORBIDITY WITH BODY MASS INDEX (BMI) OF 40.0 TO 44.9 IN ADULT (H): Primary | ICD-10-CM

## 2022-03-21 DIAGNOSIS — E66.813 CLASS 3 DRUG-INDUCED OBESITY WITHOUT SERIOUS COMORBIDITY WITH BODY MASS INDEX (BMI) OF 40.0 TO 44.9 IN ADULT (H): Primary | ICD-10-CM

## 2022-03-21 PROCEDURE — 97803 MED NUTRITION INDIV SUBSEQ: CPT | Mod: TEL | Performed by: DIETITIAN, REGISTERED

## 2022-03-21 NOTE — LETTER
3/21/2022         RE: Eloina Ann  [address] [address] [facility] [address]        Dear Colleague,    Thank you for referring your patient, Eloina Ann, to the [facility] SURGERY CLINIC AND BARIATRICS CARE [place]. Please see a copy of my visit note below.    Eloina Ann is a 31 year old who is being evaluated via a billable telephone visit.      What phone number would you like to be contacted at? [phone]  How would you like to obtain your AVS? [facility]      Medical  Weight Loss Follow-Up Diet Evaluation  Assessment:  Eloina is presenting today for a follow up weight management nutrition consultation. Pt has had an initial appointment with Dr. Shell.  Patient is considering Bariatric Surgery, if insurance will allow.    Weight loss medication: Phentermine. Meformin  Pt's weight is 255 lbs   Initial weight: 274 lbs  Weight change: 19 lbs (down)    Changes and Difficulties 11/11/2021   I have made the following changes to my diet since my last visit: ate better, ate out less, ate less   With regards to my diet, I am still struggling with: eating clean   I have made the following changes to my activity/exercise since my last visit: physical theropy. try walking or floor activities   With regards to my activity/exercise, I am still struggling with: time to do it     BMI: There is no height or weight on file to calculate BMI.  Patient weight not recorded    Estimated RMR ([place]-St Jeor equation):   1869 kcals x 1.3 (light active) = 2430 kcals (for weight maintenance)     Recommended Protein Intake: 60-80 grams of protein/day  Patient Active Problem List:  Patient Active Problem List   Diagnosis     Not immune to hepatitis B virus     Hypothyroidism     Esophageal reflux     Eczema     Dysplasia of cervix, low grade (SOCRATES 1)     Cervical cancer screening     Thyroid disease     Morbid obesity (H)     Ankle pain     Low back pain     Hirsutism     Multiple acquired skin tags      Prediabetes     Diabetes: No     Progress on goals from last visit: patient reports that she feels that she didn't eat real healthy this past week, but does feel her portion sizes are still smaller than her normal.  Patient reports that her  has also been focusing on weight loss as well, which has helped to have his support and be on the same page.  Patient reports that she continues to focus on protein first along with lots of veggies at meal times.  Patient reports that she really has been trying to avoid bread/carbs at meals.  Patient reports that she is trying to combat boredom eating by finding activities to do instead of eating.  Patient reports that she is lacking in terms of adequate water consumption, more so at work, noting that she needs to get a new water bottle to help remind her to drink more at work.      Exercise: Floor Exercises striving for daily for 30 minutes, some walking weather pending.     Nutrition Diagnosis:    Overweight/Obesity (NC 3.3) related to overeating and poor lifestyle habits as evidenced by patient's subjective statements (h/o excessive energy intake) and BMI of 43.6 kg/m2.       Intervention:  1. Food and/or nutrient delivery: balanced meals, adequate protein   2. Nutrition education: boredom eating, anti-inflammatory diet  3. Nutrition counseling: exercise regimen     Monitoring/Evaluation:    Goals:  1. Continue to focus on protein first with non starchy vegetables and limited carbohydrates at meals.   2. Increase water consumption, aiming for 64 oz/day.  3. Continue to exercise most days of the week for at least 30 minutes, increase as able.    Patient to follow up in 1 month(s) with ANN        Phone call duration: 20 minutes    Apple Dumont RD        Again, thank you for allowing me to participate in the care of your patient.        Sincerely,        Apple Dumont RD

## 2022-03-21 NOTE — PROGRESS NOTES
Eloina Ann is a 31 year old who is being evaluated via a billable telephone visit.      What phone number would you like to be contacted at? [phone]  How would you like to obtain your AVS? [facility]      Medical  Weight Loss Follow-Up Diet Evaluation  Assessment:  Eloina is presenting today for a follow up weight management nutrition consultation. Pt has had an initial appointment with Dr. Shell.  Patient is considering Bariatric Surgery, if insurance will allow.    Weight loss medication: Phentermine. Meformin  Pt's weight is 255 lbs   Initial weight: 274 lbs  Weight change: 19 lbs (down)    Changes and Difficulties 11/11/2021   I have made the following changes to my diet since my last visit: ate better, ate out less, ate less   With regards to my diet, I am still struggling with: eating clean   I have made the following changes to my activity/exercise since my last visit: physical theropy. try walking or floor activities   With regards to my activity/exercise, I am still struggling with: time to do it     BMI: There is no height or weight on file to calculate BMI.  Patient weight not recorded    Estimated RMR ([place]-St Jeor equation):   1869 kcals x 1.3 (light active) = 2430 kcals (for weight maintenance)     Recommended Protein Intake: 60-80 grams of protein/day  Patient Active Problem List:  Patient Active Problem List   Diagnosis     Not immune to hepatitis B virus     Hypothyroidism     Esophageal reflux     Eczema     Dysplasia of cervix, low grade (SOCRATES 1)     Cervical cancer screening     Thyroid disease     Morbid obesity (H)     Ankle pain     Low back pain     Hirsutism     Multiple acquired skin tags     Prediabetes     Diabetes: No     Progress on goals from last visit: patient reports that she feels that she didn't eat real healthy this past week, but does feel her portion sizes are still smaller than her normal.  Patient reports that her  has also been focusing on weight loss as  well, which has helped to have his support and be on the same page.  Patient reports that she continues to focus on protein first along with lots of veggies at meal times.  Patient reports that she really has been trying to avoid bread/carbs at meals.  Patient reports that she is trying to combat boredom eating by finding activities to do instead of eating.  Patient reports that she is lacking in terms of adequate water consumption, more so at work, noting that she needs to get a new water bottle to help remind her to drink more at work.      Exercise: Floor Exercises striving for daily for 30 minutes, some walking weather pending.     Nutrition Diagnosis:    Overweight/Obesity (NC 3.3) related to overeating and poor lifestyle habits as evidenced by patient's subjective statements (h/o excessive energy intake) and BMI of 43.6 kg/m2.       Intervention:  1. Food and/or nutrient delivery: balanced meals, adequate protein   2. Nutrition education: boredom eating, anti-inflammatory diet  3. Nutrition counseling: exercise regimen     Monitoring/Evaluation:    Goals:  1. Continue to focus on protein first with non starchy vegetables and limited carbohydrates at meals.   2. Increase water consumption, aiming for 64 oz/day.  3. Continue to exercise most days of the week for at least 30 minutes, increase as able.    Patient to follow up in 1 month(s) with RD        Phone call duration: 20 minutes    Apple Dumont RD

## 2022-04-15 ENCOUNTER — TELEPHONE (OUTPATIENT)
Dept: SURGERY | Facility: CLINIC | Age: 32
End: 2022-04-15
Payer: COMMERCIAL

## 2022-04-15 NOTE — TELEPHONE ENCOUNTER
Attempts made to contact patient in regards to phone visit that was scheduled for today with this writer at 3:30pm.  Patient did not answer the phone with attempts made, therefore left patient a detailed message including call center contact information to call and reschedule this appointment at her earliest convenience.

## 2022-08-24 DIAGNOSIS — R63.2 HYPERPHAGIA: ICD-10-CM

## 2022-08-24 RX ORDER — PHENTERMINE HYDROCHLORIDE 37.5 MG/1
TABLET ORAL
Qty: 90 TABLET | Refills: 1 | Status: CANCELLED | OUTPATIENT
Start: 2022-08-24

## 2022-08-24 NOTE — TELEPHONE ENCOUNTER
Patient called back. Relayed mychart message to her that was sent. I was not able to see it prior for some reason. Gave patient number to call to schedule an appt.

## 2022-08-24 NOTE — TELEPHONE ENCOUNTER
Patient called wondering why her medication request was denied.     If denied intentionally, please call patient and inform her why. OK to leave a detailed message. [phone].

## 2022-08-25 ENCOUNTER — VIRTUAL VISIT (OUTPATIENT)
Dept: SURGERY | Facility: CLINIC | Age: 32
End: 2022-08-25
Payer: COMMERCIAL

## 2022-08-25 DIAGNOSIS — R63.2 HYPERPHAGIA: ICD-10-CM

## 2022-08-25 DIAGNOSIS — E66.01 OBESITY, CLASS III, BMI 40-49.9 (MORBID OBESITY) (H): Primary | ICD-10-CM

## 2022-08-25 PROCEDURE — 97803 MED NUTRITION INDIV SUBSEQ: CPT | Mod: GT | Performed by: DIETITIAN, REGISTERED

## 2022-08-25 RX ORDER — PHENTERMINE HYDROCHLORIDE 37.5 MG/1
TABLET ORAL
Qty: 90 TABLET | Refills: 1 | Status: SHIPPED | OUTPATIENT
Start: 2022-08-25 | End: 2023-02-07

## 2022-08-25 NOTE — LETTER
8/25/2022         RE: Eloina Ann  [address] [facility] [address]        Dear Colleague,    Thank you for referring your patient, Eloina Ann, to the [facility] [place]. Please see a copy of my visit note below.    Eloina Ann is a 32 year old who is being evaluated via a billable video visit.      How would you like to obtain your AVS? MyChart  If the video visit is dropped, the invitation should be resent by: Text to cell phone: [phone]  Will anyone else be joining your video visit? No      Video Start Time: 10:34 AM      Medical  Weight Loss Follow-Up Diet Evaluation  Assessment:  Eloina is presenting today for a follow up weight management nutrition consultation. Pt has had an initial appointment with Dr. Shell  Weight loss medication: Phentermine and metformin  -has been working with ANN Chiu  No new weight per patient  Initial weight: 274lb    Changes and Difficulties 11/11/2021   I have made the following changes to my diet since my last visit: ate better, ate out less, ate less   With regards to my diet, I am still struggling with: eating clean   I have made the following changes to my activity/exercise since my last visit: physical theropy. try walking or floor activities   With regards to my activity/exercise, I am still struggling with: time to do it     Recommended Protein Intake: 60-80 grams of protein/day  Patient Active Problem List:  Patient Active Problem List   Diagnosis     Not immune to hepatitis B virus     Hypothyroidism     Esophageal reflux     Eczema     Dysplasia of cervix, low grade (SOCRATES 1)     Cervical cancer screening     Thyroid disease     Morbid obesity (H)     Ankle pain     Low back pain     Hirsutism     Multiple acquired skin tags     Prediabetes       Progress on goals from last visit: has been off phentermine for a week and feels like she is eating everything   -always thinking about food, craving  carbs    Dietary Recall:  Breakfast: sausage and egg tacos x2  Lunch: white rice, keilbasa, sweet potatoes and broccoli- eating leftovers for lunch a lot- sometimes will not eat the whole meal when not on phentermine  Dinner: frozen flatbread pizza  Typical snacks: none last night- sometimes will have cookies, chips  -has a long commute to and from work  Nutrition Diagnosis:    1. Overweight/Obesity (NC 3.3) related to overeating and poor lifestyle habits as evidenced by patient's subjective statements (h/o excessive energy intake).      Intervention:  1. Food and/or nutrient delivery: discussed reducing portions, measuring foods, keeping healthier snacks in the car during commute  2. Nutrition counseling: mindfulness around snacking- physical hunger vs head hunger    Monitoring/Evaluation:    Goals:  1. Keep up with 3 meals per day    Patient to follow up in 6 month(s) with bariatrician and PRN with RD-patient states financial reasons for not wanting to schedule a follow up     Video-Visit Details    Type of service:  Video Visit    Video End Time:10:58 AM    Originating Location (pt. Location): Home    Distant Location (provider location):  [facility] [place]     Platform used for Video Visit: Micky BURRELL RD        Again, thank you for allowing me to participate in the care of your patient.        Sincerely,        DALY BURRELL RD

## 2022-08-25 NOTE — PROGRESS NOTES
Eloina Ann is a 32 year old who is being evaluated via a billable video visit.      How would you like to obtain your AVS? MyChart  If the video visit is dropped, the invitation should be resent by: Text to cell phone: [phone]  Will anyone else be joining your video visit? No      Video Start Time: 10:34 AM      Medical  Weight Loss Follow-Up Diet Evaluation  Assessment:  Eloina is presenting today for a follow up weight management nutrition consultation. Pt has had an initial appointment with Dr. Shell  Weight loss medication: Phentermine and metformin  -has been working with ANN Chiu  No new weight per patient  Initial weight: 274lb    Changes and Difficulties 11/11/2021   I have made the following changes to my diet since my last visit: ate better, ate out less, ate less   With regards to my diet, I am still struggling with: eating clean   I have made the following changes to my activity/exercise since my last visit: physical theropy. try walking or floor activities   With regards to my activity/exercise, I am still struggling with: time to do it     Recommended Protein Intake: 60-80 grams of protein/day  Patient Active Problem List:  Patient Active Problem List   Diagnosis     Not immune to hepatitis B virus     Hypothyroidism     Esophageal reflux     Eczema     Dysplasia of cervix, low grade (SOCRATES 1)     Cervical cancer screening     Thyroid disease     Morbid obesity (H)     Ankle pain     Low back pain     Hirsutism     Multiple acquired skin tags     Prediabetes       Progress on goals from last visit: has been off phentermine for a week and feels like she is eating everything   -always thinking about food, craving carbs    Dietary Recall:  Breakfast: sausage and egg tacos x2  Lunch: white rice, keilbasa, sweet potatoes and broccoli- eating leftovers for lunch a lot- sometimes will not eat the whole meal when not on phentermine  Dinner: frozen flatbread pizza  Typical snacks: none last night-  sometimes will have cookies, chips  -has a long commute to and from work  Nutrition Diagnosis:    1. Overweight/Obesity (NC 3.3) related to overeating and poor lifestyle habits as evidenced by patient's subjective statements (h/o excessive energy intake).      Intervention:  1. Food and/or nutrient delivery: discussed reducing portions, measuring foods, keeping healthier snacks in the car during commute  2. Nutrition counseling: mindfulness around snacking- physical hunger vs head hunger    Monitoring/Evaluation:    Goals:  1. Keep up with 3 meals per day    Patient to follow up in 6 month(s) with bariatrician and PRN with RD-patient states financial reasons for not wanting to schedule a follow up     Video-Visit Details    Type of service:  Video Visit    Video End Time:10:58 AM    Originating Location (pt. Location): Home    Distant Location (provider location):  [facility] [place]     Platform used for Video Visit: Mikcy BURRELL RD

## 2022-08-25 NOTE — Clinical Note
Eloina says she needs a refill of her phentermine. She hasn't weighed self recently, feeling a bit out of control with food choices since running out of phentermine. Unable to schedule another RD visit due to financial reasons.

## 2022-10-15 ENCOUNTER — HEALTH MAINTENANCE LETTER (OUTPATIENT)
Age: 32
End: 2022-10-15

## 2022-11-15 DIAGNOSIS — R73.09 ELEVATED HEMOGLOBIN A1C: ICD-10-CM

## 2022-12-03 ENCOUNTER — HEALTH MAINTENANCE LETTER (OUTPATIENT)
Age: 32
End: 2022-12-03

## 2023-02-07 ENCOUNTER — OFFICE VISIT (OUTPATIENT)
Dept: SURGERY | Facility: CLINIC | Age: 33
End: 2023-02-07
Payer: COMMERCIAL

## 2023-02-07 VITALS
BODY MASS INDEX: 45.07 KG/M2 | WEIGHT: 264 LBS | SYSTOLIC BLOOD PRESSURE: 126 MMHG | HEIGHT: 64 IN | DIASTOLIC BLOOD PRESSURE: 74 MMHG

## 2023-02-07 DIAGNOSIS — R63.2 HYPERPHAGIA: ICD-10-CM

## 2023-02-07 DIAGNOSIS — E66.01 OBESITY, CLASS III, BMI 40-49.9 (MORBID OBESITY) (H): Primary | ICD-10-CM

## 2023-02-07 PROCEDURE — 99214 OFFICE O/P EST MOD 30 MIN: CPT | Performed by: FAMILY MEDICINE

## 2023-02-07 RX ORDER — SEMAGLUTIDE 0.5 MG/.5ML
0.5 INJECTION, SOLUTION SUBCUTANEOUS WEEKLY
Qty: 2 ML | Refills: 0 | Status: SHIPPED | OUTPATIENT
Start: 2023-02-07 | End: 2023-03-06

## 2023-02-07 RX ORDER — SEMAGLUTIDE 0.25 MG/.5ML
0.25 INJECTION, SOLUTION SUBCUTANEOUS WEEKLY
Qty: 2 ML | Refills: 0 | Status: SHIPPED | OUTPATIENT
Start: 2023-02-07 | End: 2023-03-07

## 2023-02-07 RX ORDER — PHENTERMINE HYDROCHLORIDE 37.5 MG/1
TABLET ORAL
Qty: 90 TABLET | Refills: 1 | Status: SHIPPED | OUTPATIENT
Start: 2023-02-07 | End: 2023-10-31

## 2023-02-07 RX ORDER — SEMAGLUTIDE 1 MG/.5ML
1 INJECTION, SOLUTION SUBCUTANEOUS WEEKLY
Qty: 2 ML | Refills: 0 | Status: SHIPPED | OUTPATIENT
Start: 2023-02-07 | End: 2023-03-06

## 2023-02-07 NOTE — LETTER
2/7/2023         RE: Eloina Ann  [address] [facility] [address]        Dear Colleague,    Thank you for referring your patient, Eloina Ann, to the [facility] [place]. Please see a copy of my visit note below.    Bariatric Follow-up    32 year old  female BMI:Body mass index is 45.08 kg/m .    Weight:   Wt Readings from Last 1 Encounters:   02/07/23 119.7 kg (264 lb)    pounds    Comorbidities:  Patient Active Problem List   Diagnosis     Not immune to hepatitis B virus     Hypothyroidism     Esophageal reflux     Eczema     Dysplasia of cervix, low grade (SOCRATES 1)     Cervical cancer screening     Thyroid disease     Morbid obesity (H)     Ankle pain     Low back pain     Hirsutism     Multiple acquired skin tags     Prediabetes     Interim: 10# down. Feels phentermine is not working. Would like to try a GLP-1 RA.Worked well at first. Ran out and gained appetite back. When restarting does not work as it did initially. With the 24 week program with registered dietitian and health  and [employer] exercise program had lost down to 253#. Her knees limited her physical activity. They improved with her weight loss.    Plan:  DIET  3 meals, protein first.   EXERCISE continue floor exercise video   PHARMACOTHERAPY start wegovy follow up with dietitian then with me in 3 months, sooner if questions or concerns    continue RD guidance     -We reviewed her medications and whether associated with weight gain.  Current Outpatient Medications   Medication     levothyroxine (SYNTHROID/LEVOTHROID) 175 MCG tablet     metFORMIN (GLUCOPHAGE) 500 MG tablet     naproxen (NAPROSYN) 500 MG tablet     pantoprazole (PROTONIX) 20 MG EC tablet     phentermine (ADIPEX-P) 37.5 MG tablet     Semaglutide-Weight Management (WEGOVY) 0.25 MG/0.5ML SOAJ     Semaglutide-Weight Management (WEGOVY) 0.5 MG/0.5ML SOAJ     Semaglutide-Weight Management (WEGOVY) 1 MG/0.5ML SOAJ      "etonogestrel (NEXPLANON) 68 MG IMPL     No current facility-administered medications for this visit.        We discussed [facility] Bariatric Basics including:  -eating 3 meals daily  -eating protein first  -eating slowly, chewing food well  -avoiding/limiting calorie containing beverages  -choosing wheat, not white with breads, crackers, pastas, deni, bagels, tortillas, rice  -limiting restaurant or cafeteria eating to twice a week or less  -We discussed the importance of restorative sleep and stress management in maintaining a healthy weight.  -We discussed insulin resistance and glycemic index as it relates to appetite and weight control  -We discussed the National Weight Control Registry healthy weight maintenance strategies and ways to optimize metabolism.  -We discussed the importance of physical activity including cardiovascular and strength training in maintaining a healthier weight and explored viable options.    Most recent labs:  Lab Results   Component Value Date    WBC 8.2 09/07/2018    HGB 10.4 (L) 09/07/2018    HCT 31.2 (L) 09/07/2018    MCV 90 09/07/2018     09/07/2018       Lab Results   Component Value Date    ALT 34 12/18/2006    AST 24 12/18/2006    ALKPHOS 116 12/18/2006       Lab Results   Component Value Date    TSH 1.44 09/06/2012       DIETARY HISTORY  Meals Per Day: 3  Eating Protein First?: yes  Food Diary: B:eggs L:leftovers sandwiches on [employer] D:chicken rice, vegetables  Snacks Per Day: yes lately  Typical Snack: chips  Fluid Intake: intentional \"could be better\"  Portion Control: improved  Calorie Containing Beverages: no  Alcohol per week: 0-6  Typical Protein Food Choices: variety  Choosing Whole Grains: yes  Grocery Shopping is done by: herself  Food Preparation is done by: herself  Meals at Restaurant/Cafeteria/Take Out Per Week: 2 lunches  Eating at the Table: yes  TV is Off During Meals: yes    Positive Changes Since Last Visit: 10# down from September  Struggling With: " "exercise/time    Knowledgeable in Reading Food Labels: yes  Getting Adequate Protein: yes  Sleeping 7-8 hours/day not wel-4-5 hours  Stress management     PHYSICAL ACTIVITY PATTERNS:  Cardiovascular: exercise videos  Strength Training: exercise videos    REVIEW OF SYSTEMS  GENERAL/CONSTITUTIONAL:  Fatigue: yes  CARDIOVASCULAR:  Chest Pain with Exertion: no  PULMONARY:  Dyspnea on exertion: yes  CPAP Use: no  Tobacco Use: no  Asthma Controlled: NA  GASTROINTESTINAL:  GERD/Heartburn: yes-not with Omeprazole  Gallbladder:   UROLOGIC:  Urinary Symptoms: no  NEUROLOGIC:  Headaches: no  Paresthesias:   PSYCHIATRIC:  Moods: stable  MUSCULOSKELETAL/RHEUMATOLOGIC  Arthralgias: improved  Myalgias:  improved  ENDOCRINE:  Monitoring Blood Sugars: no  Sugars Well Controlled: yes  DERMATOLOGIC:  Rashes: no    PHYSICAL EXAM:  Vitals: /74   Ht 1.63 m (5' 4.17\")   Wt 119.7 kg (264 lb)   BMI 45.08 kg/m    [unfilled]    GEN: Pleasant, well groomed, in no acute distress  EYES: EOMI,  ENT: airway patent  NECK: no carotid bruits, no anterior/supraclavicular lymphadenopathy, thyroid normal   HEART: Rhythm regular, rate regular, no murmur   LUNGS: Clear  ABDOMEN: soft, non-tender, obese, no rashes   VASCULAR: trace  lower extremity edema  MUSCULOSKELETAL:  muscle mass OK  SKIN:  no color changes of venous stasis, no ulcerations    Total time spent on the date of this encounter doing: chart review, review of test results, patient visit, physical exam, education, counseling, developing plan of care, and documenting = 30 minutes.              Again, thank you for allowing me to participate in the care of your patient.        Sincerely,        Theresa Shell MD    "

## 2023-02-07 NOTE — PATIENT INSTRUCTIONS
MEDICATIONS FOR WEIGHT LOSS    PHENTERMINE (Adipex): approved in 1959 for appetite suppression.  It has stimulant effects and cannot be used with Ritalin, Concerta, or other stimulants.  It is not addictive although it's chemically related to amphetamines.  Amphetamines are addictive. The most common side effects are dry mouth, increased energy and concentration, increased pulse, and constipation.  You should not take phentermine if you have glaucoma, hyperthyroidism, or uncontrolled/untreated hypertension.  $24-$30 for 90 tablets    PHENDIMETRAZINE (Bontril): Appetite suppressant/sympathomimetic.  Controlled substance.  Side effects and contraindications similar to phentermine.  $45-$60 for 3 month supply    TOPIRAMATE (Topamax): Anti-seizure medication, also used to prevent migraines.  Side effects include paresthesia, glaucoma, altered concentration, attention difficulties, memory and speech problems, metabolic acidosis, depression, increase in body temperature and decrease sweating, kidney stones, and weight loss.  Do not take Topamax while taking Depakote as this can cause high ammonia levels.  You must have reliable birth control as Topamax can cause birth defects.  Discontinue slowly to avoid seizure.  Insurance usually covers Topiramate.    QSYMIA (Phentermine + Topamax):  See above information about phentermine and Topamax.  Most common side effects are paresthesia, dizziness, distortion of taste, insomnia, constipation, and dry mouth.  $150-$220 per month    DIETHYLPROPION (Tenuate): Sympathomimetic amine.  Appetite suppressant.  Doses 25 mg before meals or 75 mg per day.  Most common side effects are hypertension, palpitations, EKG changes, and increased seizures in epileptics.  There can be a possible adverse reaction with alcohol.  $70-$90 per 3 months    XENICAL(Orlistat) (Robert OTC): Approved in 1999.  A fat-blocker.  It reduces absorption of fat by approximately 30%.  It has beneficial effects on  lipid levels.  Side effects include diarrhea, abdominal cramping, fecal incontinence, oily spotting, and flatus with discharge.  Side effects are minimized if the patient limits their dietary fat to no more than 30% of their diet.  Patients must take a multivitamin daily to avoid vitamin D, E, A, and K deficiency.  $120 per month    CONTRAVE (Naltrexone/Bupropion): Approved in 2014.  It is a combination pill including an opioid receptor blocker and a long-standing antidepressant.  Most common side effects include nausea, constipation, headache, vomiting, dizziness, trouble sleeping, dry mouth, and diarrhea.  With all antidepressants watch for mood changes and suicide ideation.  Bupropion has been known to lower the seizure threshold in those prone to seizures.  It should not be used in a patient with a recent history of bulimia. It has been associated with liver damage from taking higher than recommended doses.  Do not use countrave if you have taken opioid medications or opioid street drugs in the past 7-10 days, if you are currently on opioids, methadone, or if you are pregnant.  Do not use contrave if you have recently stopped using alcohol or benzodiazepines.  Taper off contrave slowly.  Dosing: titrate up to 2 tablets twice daily of the Naltrexone 8 mg/ Bupropion 90 mg tablets.  $200 for 90 tablets    SAXENDA (Liraglutide): A daily injectable (3mg daily) medication used for type 2 diabetes (Victoza). Glucagon-like peptide-1 (GLP-1) agonist. Contraindications include personal or family history of medullary thyroid cancer or MEN type 2. Acute pancreatitis has been observed in patients taking liraglutide. Liraglutide causes C-cell tumors in rats and mice. It is unknown whether liraglutide causes tumors in humans. Start at 0.6mg, increasing the dose weekly up to 3mg.     TRULICITY (Dulaglutide): A weekly injectable (4.5mg weekly) medication used for type 2 diabetes. Glucagon-like peptide-1(GLP-1) agonist.  Contraindications include personal or family history of medullary thyroid cancer or MEN type 2. Acute pancreatitis has been observed in patients taking liraglutide. Dulaglutide causes C-cell tumors in rats and mice. It is unknown whether liraglutide causes tumors in humans. Start at 0.75 mg, 1.5 mg, 3.0 mg, to 4.5 mg increasing the dose monthly.      VYVANSE (Lisdexamfetamine dimesylate): a CNS stimulant used to treat ADHD. Indicated for the treatment of moderate to severe Binge Eating Disorder in Adults. Contraindicated in patients with known heart disease, structural abnormalities of the heart, serious heart arrhythmias or unexplained syncope. CNS stimulants such as vyvanse may cause manic or psychotic symptoms in patients with BPAD or pre-existing psychosis. Use with caution in patients with Raynaud's phenomenon. Most common side effects include dry mouth, insomnia, decreased appetite, increased heart rate, jittery feeling, constipation and anxiety.     WEGOVY (Semaglutide): A weekly injectable (2.4mg weekly) medication used for type 2 diabetes (Ozempic). Glucagon-like peptide-1 (GLP-1) agonist. Contraindications include personal or family history of medullary thyroid cancer or MEN type 2. Acute pancreatitis has been observed in patients taking Semaglutide. Semaglutide causes C-cell tumors in rats and mice. It is unknown whether Semaglutide causes tumors in humans. Start at 0.25mg, increasing to 0.5, 1.0, 1.7 then 2.4 monthly.     MOUNJARO (Tirzepatide): A weekly injectable medication indicated for type 2 diabetes. Glucagon-like-peptide-1 (GLP-1) agonist and Glucose-Dependent Insulinotropic Polypeptide (GIP) agonist. Contraindications include personal or family history of medullary thyroid cancer or MEN type 2. Acute pancreatitis has been observed in patients taking Tirzepatide. Tirzepatide causes C-cell tumors in rats and mice. It is unknown whether Tirzepatide causes tumors in humans. Watch for neck mass,  difficulty swallowing, persistent hoarseness, and epigastric abdominal pain. Most common side effects include nausea, diarrhea, vomiting, constipation, dyspepsia, and abdominal pain. Start at 2.5mg, increasing to 5.0, 7.5, 10, 12.5 then 15mg monthly.

## 2023-02-07 NOTE — PROGRESS NOTES
Bariatric Follow-up    32 year old  female BMI:Body mass index is 45.08 kg/m .    Weight:   Wt Readings from Last 1 Encounters:   02/07/23 119.7 kg (264 lb)    pounds    Comorbidities:  Patient Active Problem List   Diagnosis     Not immune to hepatitis B virus     Hypothyroidism     Esophageal reflux     Eczema     Dysplasia of cervix, low grade (SOCRATES 1)     Cervical cancer screening     Thyroid disease     Morbid obesity (H)     Ankle pain     Low back pain     Hirsutism     Multiple acquired skin tags     Prediabetes     Interim: 10# down. Feels phentermine is not working. Would like to try a GLP-1 RA.Worked well at first. Ran out and gained appetite back. When restarting does not work as it did initially. With the 24 week program with registered dietitian and health  and [employer] exercise program had lost down to 253#. Her knees limited her physical activity. They improved with her weight loss.    Plan:  DIET  3 meals, protein first.   EXERCISE continue floor exercise video   PHARMACOTHERAPY start wegovy follow up with dietitian then with me in 3 months, sooner if questions or concerns    continue RD guidance     -We reviewed her medications and whether associated with weight gain.  Current Outpatient Medications   Medication     levothyroxine (SYNTHROID/LEVOTHROID) 175 MCG tablet     metFORMIN (GLUCOPHAGE) 500 MG tablet     naproxen (NAPROSYN) 500 MG tablet     pantoprazole (PROTONIX) 20 MG EC tablet     phentermine (ADIPEX-P) 37.5 MG tablet     Semaglutide-Weight Management (WEGOVY) 0.25 MG/0.5ML SOAJ     Semaglutide-Weight Management (WEGOVY) 0.5 MG/0.5ML SOAJ     Semaglutide-Weight Management (WEGOVY) 1 MG/0.5ML SOAJ     etonogestrel (NEXPLANON) 68 MG IMPL     No current facility-administered medications for this visit.        We discussed [facility] Bariatric Basics including:  -eating 3 meals daily  -eating protein first  -eating slowly, chewing food well  -avoiding/limiting calorie containing  "beverages  -choosing wheat, not white with breads, crackers, pastas, deni, bagels, tortillas, rice  -limiting restaurant or cafeteria eating to twice a week or less  -We discussed the importance of restorative sleep and stress management in maintaining a healthy weight.  -We discussed insulin resistance and glycemic index as it relates to appetite and weight control  -We discussed the National Weight Control Registry healthy weight maintenance strategies and ways to optimize metabolism.  -We discussed the importance of physical activity including cardiovascular and strength training in maintaining a healthier weight and explored viable options.    Most recent labs:  Lab Results   Component Value Date    WBC 8.2 09/07/2018    HGB 10.4 (L) 09/07/2018    HCT 31.2 (L) 09/07/2018    MCV 90 09/07/2018     09/07/2018       Lab Results   Component Value Date    ALT 34 12/18/2006    AST 24 12/18/2006    ALKPHOS 116 12/18/2006       Lab Results   Component Value Date    TSH 1.44 09/06/2012       DIETARY HISTORY  Meals Per Day: 3  Eating Protein First?: yes  Food Diary: B:eggs L:leftovers sandwiches on [employer] D:chicken rice, vegetables  Snacks Per Day: yes lately  Typical Snack: chips  Fluid Intake: intentional \"could be better\"  Portion Control: improved  Calorie Containing Beverages: no  Alcohol per week: 0-6  Typical Protein Food Choices: variety  Choosing Whole Grains: yes  Grocery Shopping is done by: herself  Food Preparation is done by: herself  Meals at Restaurant/Cafeteria/Take Out Per Week: 2 lunches  Eating at the Table: yes  TV is Off During Meals: yes    Positive Changes Since Last Visit: 10# down from September  Struggling With: exercise/time    Knowledgeable in Reading Food Labels: yes  Getting Adequate Protein: yes  Sleeping 7-8 hours/day not wel-4-5 hours  Stress management     PHYSICAL ACTIVITY PATTERNS:  Cardiovascular: exercise videos  Strength Training: exercise videos    REVIEW OF " "SYSTEMS  GENERAL/CONSTITUTIONAL:  Fatigue: yes  CARDIOVASCULAR:  Chest Pain with Exertion: no  PULMONARY:  Dyspnea on exertion: yes  CPAP Use: no  Tobacco Use: no  Asthma Controlled: NA  GASTROINTESTINAL:  GERD/Heartburn: yes-not with Omeprazole  Gallbladder:   UROLOGIC:  Urinary Symptoms: no  NEUROLOGIC:  Headaches: no  Paresthesias:   PSYCHIATRIC:  Moods: stable  MUSCULOSKELETAL/RHEUMATOLOGIC  Arthralgias: improved  Myalgias:  improved  ENDOCRINE:  Monitoring Blood Sugars: no  Sugars Well Controlled: yes  DERMATOLOGIC:  Rashes: no    PHYSICAL EXAM:  Vitals: /74   Ht 1.63 m (5' 4.17\")   Wt 119.7 kg (264 lb)   BMI 45.08 kg/m    [facility]    GEN: Pleasant, well groomed, in no acute distress  EYES: EOMI,  ENT: airway patent  NECK: no carotid bruits, no anterior/supraclavicular lymphadenopathy, thyroid normal   HEART: Rhythm regular, rate regular, no murmur   LUNGS: Clear  ABDOMEN: soft, non-tender, obese, no rashes   VASCULAR: trace  lower extremity edema  MUSCULOSKELETAL:  muscle mass OK  SKIN:  no color changes of venous stasis, no ulcerations    Total time spent on the date of this encounter doing: chart review, review of test results, patient visit, physical exam, education, counseling, developing plan of care, and documenting = 30 minutes.          "

## 2023-03-06 DIAGNOSIS — E66.01 OBESITY, CLASS III, BMI 40-49.9 (MORBID OBESITY) (H): ICD-10-CM

## 2023-03-06 RX ORDER — SEMAGLUTIDE 1 MG/.5ML
1 INJECTION, SOLUTION SUBCUTANEOUS WEEKLY
Qty: 2 ML | Refills: 0 | Status: SHIPPED | OUTPATIENT
Start: 2023-04-03 | End: 2023-05-01 | Stop reason: DRUGHIGH

## 2023-03-06 RX ORDER — SEMAGLUTIDE 0.5 MG/.5ML
0.5 INJECTION, SOLUTION SUBCUTANEOUS WEEKLY
Qty: 2 ML | Refills: 0 | Status: SHIPPED | OUTPATIENT
Start: 2023-03-06 | End: 2023-05-01 | Stop reason: DRUGHIGH

## 2023-05-01 ENCOUNTER — MYC MEDICAL ADVICE (OUTPATIENT)
Dept: SURGERY | Facility: CLINIC | Age: 33
End: 2023-05-01
Payer: COMMERCIAL

## 2023-05-01 DIAGNOSIS — E66.01 OBESITY, CLASS III, BMI 40-49.9 (MORBID OBESITY) (H): Primary | ICD-10-CM

## 2023-05-01 DIAGNOSIS — R73.09 ELEVATED HEMOGLOBIN A1C: ICD-10-CM

## 2023-05-16 ENCOUNTER — OFFICE VISIT (OUTPATIENT)
Dept: SURGERY | Facility: CLINIC | Age: 33
End: 2023-05-16
Payer: COMMERCIAL

## 2023-05-16 VITALS
BODY MASS INDEX: 43.02 KG/M2 | DIASTOLIC BLOOD PRESSURE: 70 MMHG | SYSTOLIC BLOOD PRESSURE: 110 MMHG | WEIGHT: 252 LBS | HEIGHT: 64 IN

## 2023-05-16 DIAGNOSIS — E66.01 MORBID OBESITY (H): Primary | ICD-10-CM

## 2023-05-16 PROCEDURE — 99214 OFFICE O/P EST MOD 30 MIN: CPT | Performed by: FAMILY MEDICINE

## 2023-05-16 NOTE — LETTER
5/16/2023         RE: Eloina Ann  [id number] [facility] [id number]        Dear Colleague,    Thank you for referring your patient, Eloina Ann, to the [facility] [place]. Please see a copy of my visit note below.    Bariatric Follow-up    32 year old  female BMI:Body mass index is 43.03 kg/m .    Weight:   Wt Readings from Last 1 Encounters:   05/16/23 114.3 kg (252 lb)    pounds      Comorbidities:  Patient Active Problem List   Diagnosis     Not immune to hepatitis B virus     Hypothyroidism     Esophageal reflux     Eczema     Dysplasia of cervix, low grade (SOCRATES 1)     Cervical cancer screening     Thyroid disease     Morbid obesity (H)     Ankle pain     Low back pain     Hirsutism     Multiple acquired skin tags     Prediabetes       Interim: maintaining a 22# weight loss. Wegovy not covered now until meeting her deductible. Planning on pregnancy in a couple of months or so.     Plan:  DIET  3 meals, protein first   EXERCISE stacking wood and walking   PHARMACOTHERAPY continue phentermine and metformin. Savings card link provided for Wegovy. Information about insulin outlet provided. Add B-12 if not taking    If + pregnancy test then stop phentermine. Can continue metformin if desires. Check Wegovy price with savings card or consider Trulicity at Insulin outlet. Consider bariatric surgery     -We reviewed her medications and whether associated with weight gain.  Current Outpatient Medications   Medication     levothyroxine (SYNTHROID/LEVOTHROID) 175 MCG tablet     metFORMIN (GLUCOPHAGE) 500 MG tablet     pantoprazole (PROTONIX) 20 MG EC tablet     phentermine (ADIPEX-P) 37.5 MG tablet     Semaglutide-Weight Management (WEGOVY) 1.7 MG/0.75ML pen     [START ON 5/29/2023] Semaglutide-Weight Management (WEGOVY) 2.4 MG/0.75ML pen     No current facility-administered medications for this visit.        We discussed [facility] Bariatric Basics  including:  -eating 3 meals daily  -eating protein first  -eating slowly, chewing food well  -avoiding/limiting calorie containing beverages  -choosing wheat, not white with breads, crackers, pastas, deni, bagels, tortillas, rice  -limiting restaurant or cafeteria eating to twice a week or less  -We discussed the importance of restorative sleep and stress management in maintaining a healthy weight.  -We discussed insulin resistance and glycemic index as it relates to appetite and weight control  -We discussed the National Weight Control Registry healthy weight maintenance strategies and ways to optimize metabolism.  -We discussed the importance of physical activity including cardiovascular and strength training in maintaining a healthier weight and explored viable options.    Most recent labs:  Lab Results   Component Value Date    WBC 8.2 09/07/2018    HGB 10.4 (L) 09/07/2018    HCT 31.2 (L) 09/07/2018    MCV 90 09/07/2018     09/07/2018       Lab Results   Component Value Date    ALT 34 12/18/2006    AST 24 12/18/2006    ALKPHOS 116 12/18/2006       Lab Results   Component Value Date    TSH 1.44 09/06/2012       DIETARY HISTORY  Meals Per Day: 3  Eating Protein First?: yes  Food Diary: B:eggs with meat and cheese L:sandwich or left overs D:pork chops and rice and veggies  Snacks Per Day: not many  Typical Snack: fruit, string cheese, chips,   Fluid Intake: intentional  Portion Control: improved  Calorie Containing Beverages: no  Alcohol per week: 0-6  Typical Protein Food Choices: variety  Choosing Whole Grains: yes  Grocery Shopping is done by: shared  Food Preparation is done by: herself/shared  Meals at Restaurant/Cafeteria/Take Out Per Week: 0-1  Eating at the Table: yes  TV is Off During Meals: yes    Positive Changes Since Last Visit:   Struggling With: Wegovy not covered.    Knowledgeable in Reading Food Labels: yes  Getting Adequate Protein: yes  Sleeping 7-8 hours/day 5 hours  Stress management  "mod/high- busy    PHYSICAL ACTIVITY PATTERNS:  Cardiovascular: walking,   Strength Training: cutting wood, stacking on wood,     REVIEW OF SYSTEMS  GENERAL/CONSTITUTIONAL:  Fatigue: yes  CARDIOVASCULAR:  Chest Pain with Exertion: no  PULMONARY:  Dyspnea on exertion: yes  CPAP Use: no  Tobacco Use: no  Asthma Controlled: NA  GASTROINTESTINAL:  GERD/Heartburn:   Gallbladder:   UROLOGIC:  Urinary Symptoms:   NEUROLOGIC:  Headaches:   Paresthesias:  PSYCHIATRIC:  Moods: euthymic  MUSCULOSKELETAL/RHEUMATOLOGIC  Arthralgias: no  Myalgias: no  ENDOCRINE:  Monitoring Blood Sugars: no  Sugars Well Controlled: eys  DERMATOLOGIC:  Rashes: no    PHYSICAL EXAM:  Vitals: /70   Ht 1.63 m (5' 4.17\")   Wt 114.3 kg (252 lb)   BMI 43.03 kg/m        GEN: Pleasant, well groomed, in no acute distress  EYES: EOMI,  ENT: airway patent  NECK: no carotid bruits, no anterior/supraclavicular lymphadenopathy, thyroid normal   HEART: Rhythm regular, rate regular, no murmur   LUNGS: Clear  ABDOMEN: soft, non-tender, obese, no rashes   VASCULAR: no  lower extremity edema  MUSCULOSKELETAL:  muscle mass OK  SKIN:  no color changes of venous stasis, no ulcerations    Total time spent on the date of this encounter doing: chart review, review of test results, patient visit, physical exam, education, counseling, developing plan of care, and documenting = 30minutes.              Again, thank you for allowing me to participate in the care of your patient.        Sincerely,        Theresa Shell MD    "

## 2023-05-16 NOTE — PATIENT INSTRUCTIONS
"  https://[email or web address]/wegovy/savings-card.html    Insulin Outlet has Trulicity for $294/month    Be sure to take a \"sublingual\" or \"rapid dissolve\" B-12 while on metformin.Any strength      WEIGHT MAINTENANCE STRATEGIES    According to the National Weight Control Registry there are several things that people who have lost weight and kept it off have in common. Some of them are...    1. 3 MEALS A DAY:  Make sure you are eating 3 meals each day.  No skipping meals.  80% of people who skip meals are overweight or obese.  Missing meals slows the metabolism, making it harder to maintain a healthy weight.    2. FOOD DIARY:  Much like keeping a ledger for your checkbook, keeping a food and exercise diary helps you \"keep track\" of the balance of energy (calories) in and energy out. It also helps you recognize potential unhealthy deviations from healthy patterns before they become habit. It's a nice way to monitor whether you are getting the protein, fiber, and other nutrients that your body needs.    3. FOLLOW-UP:  Studies show that those who follow up with their health professional regularly maintained their weight loss and those who are \"lost to follow-up \"are at risk for regain.  Moreover, it is essential to monitor vitamin levels with laboratory studies for life following bariatric surgery.     4.  HIGH FIBER/LOW FAT:  Lean sources of protein (skim milk, skinless, baked or broiled chicken breast, fish, etc.)  will help you meet your protein needs while fruits, vegetables, and whole grains will help you get the fiber that your body needs.  This is heart healthy eating and helps to keep calorie levels in balance.    5.  8,000 STEPS PER DAY:  This is a \"weight maintenance dose. \"  It is essential to get your steps in every day, 7 days a week.  You don't have to \"work out \" 7 days a week, but throughout the day, getting 8000 steps will help you maintain the weight you have lost.  Parking far away, taking the stairs " "instead of the elevator, and pacing while on the phone are some ways to help achieve this goal.    6.  Eat at home 90% OF THE TIME:  People who maintain a healthy weight eat at home, or meal prepared at home, 90% of the time.  Studies show that people consume an average of 770 merlyn when eating out at a restaurant and 440 merlyn when eating a meal prepared at home.  This equates to almost 35 pounds of excess weight for a person who eats out once a day for 1 year.      OTHER HELPFUL HABITS    -Minimize liquid calories.  Skim milk is okay.  -Avoiding \"mindless \"eating, i.e., eating at the TV, and the car, in front of the computer.  -Protect your sleep and Manage your stress        OPTIMIZING YOUR METABOLISM FOR LIFE    1.  MUSCLE MAINTENANCE: Muscle burns calories up to 70% better than fat test.  As we age her body composition changes. We lose muscle mass.  Weight training can help us keep and even build muscle mass.  Dumbbells, pushups, rubber band training, weight machines are all examples of ways to keep and/or build muscle mass.    2.  MOVE: 8000 steps daily has been shown to be a weight maintenance dose.  Aim for 10,000 steps each day. Helpfull habits include taking the stairs instead of the elevator when possible, parking at the far end of the parking lot, pacing while on the phone, and taking the dog for a walk.  Of course using a treadmill, stair climber, elliptical , and bicycle are all ways of getting 10,000 steps.    3.  3 MEALS EACH DAY: Make sure to get your 3 meals each day.  Skipping breakfast, working through your lunch, or not eating dinner will lead to a slowing of your metabolism.  Studies show that 80% of people who skip meals are overweight or obese.    4.  ADEQUATE PROTEIN INTAKE: Getting adequate protein is beneficial for a number of reasons: to aid the healing process, to blunt cravings immediately after eating and for a period of time after eating, to help keep blood sugars level, and to " help you maintain your muscle mass.  See #1 above.

## 2023-05-16 NOTE — PROGRESS NOTES
Bariatric Follow-up    32 year old  female BMI:Body mass index is 43.03 kg/m .    Weight:   Wt Readings from Last 1 Encounters:   05/16/23 114.3 kg (252 lb)    pounds      Comorbidities:  Patient Active Problem List   Diagnosis     Not immune to hepatitis B virus     Hypothyroidism     Esophageal reflux     Eczema     Dysplasia of cervix, low grade (SOCRATES 1)     Cervical cancer screening     Thyroid disease     Morbid obesity (H)     Ankle pain     Low back pain     Hirsutism     Multiple acquired skin tags     Prediabetes       Interim: maintaining a 22# weight loss. Wegovy not covered now until meeting her deductible. Planning on pregnancy in a couple of months or so.     Plan:  DIET  3 meals, protein first   EXERCISE stacking wood and walking   PHARMACOTHERAPY continue phentermine and metformin. Savings card link provided for Wegovy. Information about insulin outlet provided. Add B-12 if not taking    If + pregnancy test then stop phentermine. Can continue metformin if desires. Check Wegovy price with savings card or consider Trulicity at Insulin outlet. Consider bariatric surgery     -We reviewed her medications and whether associated with weight gain.  Current Outpatient Medications   Medication     levothyroxine (SYNTHROID/LEVOTHROID) 175 MCG tablet     metFORMIN (GLUCOPHAGE) 500 MG tablet     pantoprazole (PROTONIX) 20 MG EC tablet     phentermine (ADIPEX-P) 37.5 MG tablet     Semaglutide-Weight Management (WEGOVY) 1.7 MG/0.75ML pen     [START ON 5/29/2023] Semaglutide-Weight Management (WEGOVY) 2.4 MG/0.75ML pen     No current facility-administered medications for this visit.        We discussed [facility] Bariatric Basics including:  -eating 3 meals daily  -eating protein first  -eating slowly, chewing food well  -avoiding/limiting calorie containing beverages  -choosing wheat, not white with breads, crackers, pastas, deni, bagels, tortillas, rice  -limiting restaurant or cafeteria eating to twice a  week or less  -We discussed the importance of restorative sleep and stress management in maintaining a healthy weight.  -We discussed insulin resistance and glycemic index as it relates to appetite and weight control  -We discussed the National Weight Control Registry healthy weight maintenance strategies and ways to optimize metabolism.  -We discussed the importance of physical activity including cardiovascular and strength training in maintaining a healthier weight and explored viable options.    Most recent labs:  Lab Results   Component Value Date    WBC 8.2 09/07/2018    HGB 10.4 (L) 09/07/2018    HCT 31.2 (L) 09/07/2018    MCV 90 09/07/2018     09/07/2018       Lab Results   Component Value Date    ALT 34 12/18/2006    AST 24 12/18/2006    ALKPHOS 116 12/18/2006       Lab Results   Component Value Date    TSH 1.44 09/06/2012       DIETARY HISTORY  Meals Per Day: 3  Eating Protein First?: yes  Food Diary: B:eggs with meat and cheese L:sandwich or left overs D:pork chops and rice and veggies  Snacks Per Day: not many  Typical Snack: fruit, string cheese, chips,   Fluid Intake: intentional  Portion Control: improved  Calorie Containing Beverages: no  Alcohol per week: 0-6  Typical Protein Food Choices: variety  Choosing Whole Grains: yes  Grocery Shopping is done by: shared  Food Preparation is done by: herself/shared  Meals at Restaurant/Cafeteria/Take Out Per Week: 0-1  Eating at the Table: yes  TV is Off During Meals: yes    Positive Changes Since Last Visit:   Struggling With: Wegovy not covered.    Knowledgeable in Reading Food Labels: yes  Getting Adequate Protein: yes  Sleeping 7-8 hours/day 5 hours  Stress management mod/high- busy    PHYSICAL ACTIVITY PATTERNS:  Cardiovascular: walking,   Strength Training: cutting wood, stacking on wood,     REVIEW OF SYSTEMS  GENERAL/CONSTITUTIONAL:  Fatigue: yes  CARDIOVASCULAR:  Chest Pain with Exertion: no  PULMONARY:  Dyspnea on exertion: yes  CPAP Use:  "no  Tobacco Use: no  Asthma Controlled: NA  GASTROINTESTINAL:  GERD/Heartburn:   Gallbladder:   UROLOGIC:  Urinary Symptoms:   NEUROLOGIC:  Headaches:   Paresthesias:  PSYCHIATRIC:  Moods: euthymic  MUSCULOSKELETAL/RHEUMATOLOGIC  Arthralgias: no  Myalgias: no  ENDOCRINE:  Monitoring Blood Sugars: no  Sugars Well Controlled: eys  DERMATOLOGIC:  Rashes: no    PHYSICAL EXAM:  Vitals: /70   Ht 1.63 m (5' 4.17\")   Wt 114.3 kg (252 lb)   BMI 43.03 kg/m        GEN: Pleasant, well groomed, in no acute distress  EYES: EOMI,  ENT: airway patent  NECK: no carotid bruits, no anterior/supraclavicular lymphadenopathy, thyroid normal   HEART: Rhythm regular, rate regular, no murmur   LUNGS: Clear  ABDOMEN: soft, non-tender, obese, no rashes   VASCULAR: no  lower extremity edema  MUSCULOSKELETAL:  muscle mass OK  SKIN:  no color changes of venous stasis, no ulcerations    Total time spent on the date of this encounter doing: chart review, review of test results, patient visit, physical exam, education, counseling, developing plan of care, and documenting = 30minutes.          "

## 2023-10-31 DIAGNOSIS — R63.2 HYPERPHAGIA: ICD-10-CM

## 2023-10-31 RX ORDER — PHENTERMINE HYDROCHLORIDE 37.5 MG/1
TABLET ORAL
Qty: 90 TABLET | Refills: 1 | Status: SHIPPED | OUTPATIENT
Start: 2023-10-31

## 2023-11-16 DIAGNOSIS — R73.09 ELEVATED HEMOGLOBIN A1C: ICD-10-CM

## 2024-01-07 ENCOUNTER — HEALTH MAINTENANCE LETTER (OUTPATIENT)
Age: 34
End: 2024-01-07

## 2025-01-25 ENCOUNTER — HEALTH MAINTENANCE LETTER (OUTPATIENT)
Age: 35
End: 2025-01-25